# Patient Record
Sex: MALE | Race: WHITE | NOT HISPANIC OR LATINO | Employment: OTHER | URBAN - METROPOLITAN AREA
[De-identification: names, ages, dates, MRNs, and addresses within clinical notes are randomized per-mention and may not be internally consistent; named-entity substitution may affect disease eponyms.]

---

## 2020-11-11 PROCEDURE — U0003 INFECTIOUS AGENT DETECTION BY NUCLEIC ACID (DNA OR RNA); SEVERE ACUTE RESPIRATORY SYNDROME CORONAVIRUS 2 (SARS-COV-2) (CORONAVIRUS DISEASE [COVID-19]), AMPLIFIED PROBE TECHNIQUE, MAKING USE OF HIGH THROUGHPUT TECHNOLOGIES AS DESCRIBED BY CMS-2020-01-R: HCPCS | Performed by: NURSE PRACTITIONER

## 2020-11-13 ENCOUNTER — TELEPHONE (OUTPATIENT)
Dept: URGENT CARE | Facility: CLINIC | Age: 60
End: 2020-11-13

## 2020-11-15 ENCOUNTER — APPOINTMENT (OUTPATIENT)
Dept: CT IMAGING | Facility: HOSPITAL | Age: 60
End: 2020-11-15

## 2020-11-15 ENCOUNTER — HOSPITAL ENCOUNTER (INPATIENT)
Facility: HOSPITAL | Age: 60
LOS: 3 days | Discharge: HOME | End: 2020-11-18
Attending: EMERGENCY MEDICINE | Admitting: INTERNAL MEDICINE

## 2020-11-15 DIAGNOSIS — U07.1 PNEUMONIA DUE TO COVID-19 VIRUS: Primary | ICD-10-CM

## 2020-11-15 DIAGNOSIS — J12.82 PNEUMONIA DUE TO COVID-19 VIRUS: Primary | ICD-10-CM

## 2020-11-15 DIAGNOSIS — R09.02 HYPOXEMIA: ICD-10-CM

## 2020-11-15 LAB
ALBUMIN SERPL-MCNC: 3.7 G/DL (ref 3.5–5.2)
ALBUMIN/GLOB SERPL: 1.2 G/DL
ALP SERPL-CCNC: 97 U/L (ref 39–117)
ALT SERPL W P-5'-P-CCNC: 26 U/L (ref 1–41)
ANION GAP SERPL CALCULATED.3IONS-SCNC: 12 MMOL/L (ref 5–15)
AST SERPL-CCNC: 18 U/L (ref 1–40)
BASOPHILS # BLD AUTO: 0 10*3/MM3 (ref 0–0.2)
BASOPHILS NFR BLD AUTO: 0.2 % (ref 0–1.5)
BILIRUB SERPL-MCNC: 0.6 MG/DL (ref 0–1.2)
BUN SERPL-MCNC: 22 MG/DL (ref 6–20)
BUN/CREAT SERPL: 27.2 (ref 7–25)
CALCIUM SPEC-SCNC: 9.2 MG/DL (ref 8.6–10.5)
CHLORIDE SERPL-SCNC: 95 MMOL/L (ref 98–107)
CO2 SERPL-SCNC: 25 MMOL/L (ref 22–29)
CREAT SERPL-MCNC: 0.81 MG/DL (ref 0.76–1.27)
CRP SERPL-MCNC: 25.04 MG/DL (ref 0–0.5)
DEPRECATED RDW RBC AUTO: 45.1 FL (ref 37–54)
EOSINOPHIL # BLD AUTO: 0 10*3/MM3 (ref 0–0.4)
EOSINOPHIL NFR BLD AUTO: 0 % (ref 0.3–6.2)
ERYTHROCYTE [DISTWIDTH] IN BLOOD BY AUTOMATED COUNT: 13.7 % (ref 12.3–15.4)
GFR SERPL CREATININE-BSD FRML MDRD: 98 ML/MIN/1.73
GLOBULIN UR ELPH-MCNC: 3.1 GM/DL
GLUCOSE SERPL-MCNC: 126 MG/DL (ref 65–99)
HCT VFR BLD AUTO: 42.7 % (ref 37.5–51)
HGB BLD-MCNC: 14.7 G/DL (ref 13–17.7)
HOLD SPECIMEN: NORMAL
LYMPHOCYTES # BLD AUTO: 0.3 10*3/MM3 (ref 0.7–3.1)
LYMPHOCYTES NFR BLD AUTO: 2 % (ref 19.6–45.3)
MCH RBC QN AUTO: 32.9 PG (ref 26.6–33)
MCHC RBC AUTO-ENTMCNC: 34.5 G/DL (ref 31.5–35.7)
MCV RBC AUTO: 95.3 FL (ref 79–97)
MONOCYTES # BLD AUTO: 0.3 10*3/MM3 (ref 0.1–0.9)
MONOCYTES NFR BLD AUTO: 2 % (ref 5–12)
NEUTROPHILS NFR BLD AUTO: 13 10*3/MM3 (ref 1.7–7)
NEUTROPHILS NFR BLD AUTO: 95.8 % (ref 42.7–76)
NRBC BLD AUTO-RTO: 0 /100 WBC (ref 0–0.2)
PLATELET # BLD AUTO: 343 10*3/MM3 (ref 140–450)
PMV BLD AUTO: 7.7 FL (ref 6–12)
POTASSIUM SERPL-SCNC: 4.2 MMOL/L (ref 3.5–5.2)
PROT SERPL-MCNC: 6.8 G/DL (ref 6–8.5)
RBC # BLD AUTO: 4.48 10*6/MM3 (ref 4.14–5.8)
SODIUM SERPL-SCNC: 132 MMOL/L (ref 136–145)
WBC # BLD AUTO: 13.5 10*3/MM3 (ref 3.4–10.8)
WHOLE BLOOD HOLD SPECIMEN: NORMAL

## 2020-11-15 PROCEDURE — 93005 ELECTROCARDIOGRAM TRACING: CPT | Performed by: EMERGENCY MEDICINE

## 2020-11-15 PROCEDURE — 0 IOPAMIDOL PER 1 ML: Performed by: EMERGENCY MEDICINE

## 2020-11-15 PROCEDURE — 80053 COMPREHEN METABOLIC PANEL: CPT | Performed by: EMERGENCY MEDICINE

## 2020-11-15 PROCEDURE — G0378 HOSPITAL OBSERVATION PER HR: HCPCS

## 2020-11-15 PROCEDURE — 25010000002 DEXAMETHASONE PER 1 MG: Performed by: EMERGENCY MEDICINE

## 2020-11-15 PROCEDURE — 87040 BLOOD CULTURE FOR BACTERIA: CPT | Performed by: EMERGENCY MEDICINE

## 2020-11-15 PROCEDURE — 85025 COMPLETE CBC W/AUTO DIFF WBC: CPT | Performed by: EMERGENCY MEDICINE

## 2020-11-15 PROCEDURE — 94799 UNLISTED PULMONARY SVC/PX: CPT

## 2020-11-15 PROCEDURE — 25010000002 AZITHROMYCIN PER 500 MG: Performed by: EMERGENCY MEDICINE

## 2020-11-15 PROCEDURE — 86140 C-REACTIVE PROTEIN: CPT | Performed by: EMERGENCY MEDICINE

## 2020-11-15 PROCEDURE — 99284 EMERGENCY DEPT VISIT MOD MDM: CPT

## 2020-11-15 PROCEDURE — 71275 CT ANGIOGRAPHY CHEST: CPT

## 2020-11-15 RX ORDER — DEXAMETHASONE SODIUM PHOSPHATE 4 MG/ML
8 INJECTION, SOLUTION INTRA-ARTICULAR; INTRALESIONAL; INTRAMUSCULAR; INTRAVENOUS; SOFT TISSUE ONCE
Status: COMPLETED | OUTPATIENT
Start: 2020-11-15 | End: 2020-11-15

## 2020-11-15 RX ORDER — ALBUTEROL SULFATE 90 UG/1
2 AEROSOL, METERED RESPIRATORY (INHALATION) ONCE
Status: COMPLETED | OUTPATIENT
Start: 2020-11-15 | End: 2020-11-15

## 2020-11-15 RX ADMIN — SODIUM CHLORIDE, POTASSIUM CHLORIDE, SODIUM LACTATE AND CALCIUM CHLORIDE 2000 ML: 600; 310; 30; 20 INJECTION, SOLUTION INTRAVENOUS at 13:48

## 2020-11-15 RX ADMIN — DEXAMETHASONE SODIUM PHOSPHATE 8 MG: 4 INJECTION, SOLUTION INTRAMUSCULAR; INTRAVENOUS at 13:47

## 2020-11-15 RX ADMIN — ALBUTEROL SULFATE 2 PUFF: 108 AEROSOL, METERED RESPIRATORY (INHALATION) at 13:16

## 2020-11-15 RX ADMIN — IOPAMIDOL 100 ML: 755 INJECTION, SOLUTION INTRAVENOUS at 14:50

## 2020-11-15 RX ADMIN — SODIUM CHLORIDE 500 MG: 900 INJECTION, SOLUTION INTRAVENOUS at 13:48

## 2020-11-15 NOTE — ED PROVIDER NOTES
Subjective   60-year-old male complaining of severe weakness and shortness of breath.  The patient reportedly had his home O2 saturation checked and found to be in the mid low 80s after just standing and walking across the room.  The patient states that he has had subjective fever as well as chills.  He has had an extensive nonproductive cough and states that his chest feels like it is on fire from irritation.  He reports he has had decrease in taste.  He reports he has had diarrhea.  Reports no vomiting but states that he has not had much of an appetite.  He has no history of previous reactive airway disease          Review of Systems   Constitutional: Positive for chills, fatigue and fever. Negative for unexpected weight change.   Respiratory: Positive for cough, chest tightness and shortness of breath. Negative for choking, wheezing and stridor.    Musculoskeletal: Positive for arthralgias and myalgias.   Hematological: Does not bruise/bleed easily.   All other systems reviewed and are negative.      Past Medical History:   Diagnosis Date   • Gout        No Known Allergies    No past surgical history on file.    No family history on file.    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 Conjunctivae are not injected. normal tympanic membranes. Oropharynx and nares are normal.   Neck: Supple. Midline  trachea. No JVD. No goiter.   Chest: Patient has Rales and rhonchi scattered bilaterally and equal breath sounds bilaterally regular rate and rhythm without murmur or rub.   Abdomen: Positive bowel sounds nontender nondistended. No rebound or peritoneal signs. No CVA tenderness.   Extremities no clubbing cyanosis or edema motor sensory exam is normal the full range of motion is intact   skin: Warm and dry, no rashes or petechia.   Lymphatic: No regional lymphadenopathy. No calf pain, swelling or Thierno's sign    Procedures           ED Course                                 Labs Reviewed   COMPREHENSIVE METABOLIC PANEL - Abnormal; Notable for the following components:       Result Value    Glucose 126 (*)     BUN 22 (*)     Sodium 132 (*)     Chloride 95 (*)     BUN/Creatinine Ratio 27.2 (*)     All other components within normal limits    Narrative:     GFR Normal >60  Chronic Kidney Disease <60  Kidney Failure <15     C-REACTIVE PROTEIN - Abnormal; Notable for the following components:    C-Reactive Protein 25.04 (*)     All other components within normal limits   CBC WITH AUTO DIFFERENTIAL - Abnormal; Notable for the following components:    WBC 13.50 (*)     Neutrophil % 95.8 (*)     Lymphocyte % 2.0 (*)     Monocyte % 2.0 (*)     Eosinophil % 0.0 (*)     Neutrophils, Absolute 13.00 (*)     Lymphocytes, Absolute 0.30 (*)     All other components within normal limits   BLOOD CULTURE   CBC AND DIFFERENTIAL    Narrative:     The following orders were created for panel order CBC & Differential.  Procedure                               Abnormality         Status                     ---------                               -----------         ------                     CBC Auto Differential[[id number]]        Abnormal            Final result                 Please view results for these tests on the individual orders.   EXTRA TUBES    Narrative:     The following orders were created for panel order Extra  Tubes.  Procedure                               Abnormality         Status                     ---------                               -----------         ------                     Light Blue Top[[id number]]                                   Final result               Gold Top - SST[[id number]]                                   Final result                 Please view results for these tests on the individual orders.   LIGHT BLUE TOP   GOLD TOP - SST     Medications   azithromycin 500 mg IVPB in 250 mL NS (500 mg Intravenous New Bag 11/15/20 1348)   albuterol sulfate HFA (PROVENTIL HFA;VENTOLIN HFA;PROAIR HFA) inhaler 2 puff (2 puffs Inhalation Given 11/15/20 1316)   dexamethasone (DECADRON) injection 8 mg (8 mg Intravenous Given 11/15/20 1347)   lactated ringers bolus 2,000 mL (2,000 mL Intravenous New Bag 11/15/20 1348)   iopamidol (ISOVUE-370) 76 % injection 100 mL (100 mL Intravenous Given 11/15/20 1450)     Ct Chest Pulmonary Embolism    Result Date: 11/15/2020  No evidence of pulmonary embolism. Multifocal pneumonia in a distribution consistent with Covid 19 pneumonia  Electronically Signed By-Leif Phoenix MD On:11/15/2020 3:10 PM This report was finalized on [id number] by  Leif Phoenix MD.              MDM  Number of Diagnoses or Management Options     Amount and/or Complexity of Data Reviewed  Clinical lab tests: reviewed  Tests in the radiology section of CPT®: reviewed  Review and summarize past medical records: yes  Independent visualization of images, tracings, or specimens: yes    Risk of Complications, Morbidity, and/or Mortality  Presenting problems: high  Diagnostic procedures: high  Management options: high  General comments: The patient's physician called prior to arrival and wanted the patient admitted for oxygen support and definitive management.  The PCP also requested Dr. Velasquez consult on the case.  The patient was agreeable with hospitalizations as he stated he felt terrible.        Final  diagnoses:   Pneumonia due to COVID-19 virus   Hypoxemia            Jacky Long MD  11/15/20 7496

## 2020-11-16 LAB
ALBUMIN SERPL-MCNC: 3 G/DL (ref 3.5–5.2)
ALBUMIN SERPL-MCNC: 3.1 G/DL (ref 3.5–5.2)
ALP SERPL-CCNC: 85 U/L (ref 39–117)
ALP SERPL-CCNC: 89 U/L (ref 39–117)
ALT SERPL W P-5'-P-CCNC: 26 U/L (ref 1–41)
ALT SERPL W P-5'-P-CCNC: 30 U/L (ref 1–41)
AST SERPL-CCNC: 20 U/L (ref 1–40)
AST SERPL-CCNC: 26 U/L (ref 1–40)
BILIRUB CONJ SERPL-MCNC: 0.2 MG/DL (ref 0–0.3)
BILIRUB CONJ SERPL-MCNC: <0.2 MG/DL (ref 0–0.3)
BILIRUB INDIRECT SERPL-MCNC: 0.3 MG/DL
BILIRUB INDIRECT SERPL-MCNC: ABNORMAL MG/DL
BILIRUB SERPL-MCNC: 0.5 MG/DL (ref 0–1.2)
BILIRUB SERPL-MCNC: 0.5 MG/DL (ref 0–1.2)
CREAT SERPL-MCNC: 0.8 MG/DL (ref 0.76–1.27)
D DIMER PPP FEU-MCNC: 0.81 MG/L (FEU) (ref 0–0.59)
GFR SERPL CREATININE-BSD FRML MDRD: 99 ML/MIN/1.73
PROCALCITONIN SERPL-MCNC: 0.05 NG/ML (ref 0–0.25)
PROT SERPL-MCNC: 5.7 G/DL (ref 6–8.5)
PROT SERPL-MCNC: 6.1 G/DL (ref 6–8.5)

## 2020-11-16 PROCEDURE — 85379 FIBRIN DEGRADATION QUANT: CPT | Performed by: INTERNAL MEDICINE

## 2020-11-16 PROCEDURE — 25010000002 DEXAMETHASONE PER 1 MG: Performed by: NURSE PRACTITIONER

## 2020-11-16 PROCEDURE — 80076 HEPATIC FUNCTION PANEL: CPT | Performed by: NURSE PRACTITIONER

## 2020-11-16 PROCEDURE — 99220 PR INITIAL OBSERVATION CARE/DAY 70 MINUTES: CPT | Performed by: INTERNAL MEDICINE

## 2020-11-16 PROCEDURE — 25010000002 AZITHROMYCIN PER 500 MG: Performed by: NURSE PRACTITIONER

## 2020-11-16 PROCEDURE — 25010000003 AMPICILLIN-SULBACTAM PER 1.5 G: Performed by: NURSE PRACTITIONER

## 2020-11-16 PROCEDURE — G0378 HOSPITAL OBSERVATION PER HR: HCPCS

## 2020-11-16 PROCEDURE — XW033E5 INTRODUCTION OF REMDESIVIR ANTI-INFECTIVE INTO PERIPHERAL VEIN, PERCUTANEOUS APPROACH, NEW TECHNOLOGY GROUP 5: ICD-10-PCS | Performed by: INTERNAL MEDICINE

## 2020-11-16 PROCEDURE — 25010000002 ENOXAPARIN PER 10 MG: Performed by: NURSE PRACTITIONER

## 2020-11-16 PROCEDURE — 82565 ASSAY OF CREATININE: CPT | Performed by: NURSE PRACTITIONER

## 2020-11-16 PROCEDURE — 94799 UNLISTED PULMONARY SVC/PX: CPT

## 2020-11-16 PROCEDURE — 84145 PROCALCITONIN (PCT): CPT | Performed by: NURSE PRACTITIONER

## 2020-11-16 RX ORDER — ALBUTEROL SULFATE 90 UG/1
2 AEROSOL, METERED RESPIRATORY (INHALATION) EVERY 4 HOURS PRN
Status: DISCONTINUED | OUTPATIENT
Start: 2020-11-16 | End: 2020-11-18 | Stop reason: HOSPADM

## 2020-11-16 RX ORDER — ASCORBIC ACID 500 MG
1000 TABLET ORAL DAILY
Status: DISCONTINUED | OUTPATIENT
Start: 2020-11-16 | End: 2020-11-18 | Stop reason: HOSPADM

## 2020-11-16 RX ORDER — BUDESONIDE AND FORMOTEROL FUMARATE DIHYDRATE 160; 4.5 UG/1; UG/1
2 AEROSOL RESPIRATORY (INHALATION)
Status: DISCONTINUED | OUTPATIENT
Start: 2020-11-16 | End: 2020-11-18 | Stop reason: HOSPADM

## 2020-11-16 RX ORDER — SODIUM CHLORIDE 9 MG/ML
125 INJECTION, SOLUTION INTRAVENOUS CONTINUOUS
Status: DISCONTINUED | OUTPATIENT
Start: 2020-11-16 | End: 2020-11-18

## 2020-11-16 RX ORDER — BUDESONIDE 0.5 MG/2ML
0.5 INHALANT ORAL
Status: DISCONTINUED | OUTPATIENT
Start: 2020-11-16 | End: 2020-11-16 | Stop reason: ALTCHOICE

## 2020-11-16 RX ORDER — IPRATROPIUM BROMIDE AND ALBUTEROL SULFATE 2.5; .5 MG/3ML; MG/3ML
3 SOLUTION RESPIRATORY (INHALATION) EVERY 4 HOURS PRN
Status: DISCONTINUED | OUTPATIENT
Start: 2020-11-16 | End: 2020-11-16

## 2020-11-16 RX ORDER — PANTOPRAZOLE SODIUM 40 MG/1
40 TABLET, DELAYED RELEASE ORAL
Status: DISCONTINUED | OUTPATIENT
Start: 2020-11-17 | End: 2020-11-18 | Stop reason: HOSPADM

## 2020-11-16 RX ORDER — ALBUTEROL SULFATE 2.5 MG/3ML
2.5 SOLUTION RESPIRATORY (INHALATION) EVERY 4 HOURS PRN
Status: DISCONTINUED | OUTPATIENT
Start: 2020-11-16 | End: 2020-11-16

## 2020-11-16 RX ORDER — DEXAMETHASONE SODIUM PHOSPHATE 4 MG/ML
6 INJECTION, SOLUTION INTRA-ARTICULAR; INTRALESIONAL; INTRAMUSCULAR; INTRAVENOUS; SOFT TISSUE DAILY
Status: DISCONTINUED | OUTPATIENT
Start: 2020-11-16 | End: 2020-11-16

## 2020-11-16 RX ORDER — BENZONATATE 100 MG/1
200 CAPSULE ORAL 3 TIMES DAILY PRN
Status: DISCONTINUED | OUTPATIENT
Start: 2020-11-16 | End: 2020-11-18 | Stop reason: HOSPADM

## 2020-11-16 RX ORDER — ONDANSETRON 4 MG/1
4 TABLET, FILM COATED ORAL EVERY 6 HOURS PRN
Status: DISCONTINUED | OUTPATIENT
Start: 2020-11-16 | End: 2020-11-18 | Stop reason: HOSPADM

## 2020-11-16 RX ORDER — GUAIFENESIN 600 MG/1
600 TABLET, EXTENDED RELEASE ORAL EVERY 12 HOURS SCHEDULED
Status: DISCONTINUED | OUTPATIENT
Start: 2020-11-16 | End: 2020-11-18 | Stop reason: HOSPADM

## 2020-11-16 RX ORDER — ZINC SULFATE 50(220)MG
220 CAPSULE ORAL DAILY
Status: DISCONTINUED | OUTPATIENT
Start: 2020-11-16 | End: 2020-11-18 | Stop reason: HOSPADM

## 2020-11-16 RX ORDER — DEXAMETHASONE SODIUM PHOSPHATE 4 MG/ML
2 INJECTION, SOLUTION INTRA-ARTICULAR; INTRALESIONAL; INTRAMUSCULAR; INTRAVENOUS; SOFT TISSUE EVERY 8 HOURS
Status: DISCONTINUED | OUTPATIENT
Start: 2020-11-16 | End: 2020-11-17

## 2020-11-16 RX ADMIN — SODIUM CHLORIDE 500 MG: 900 INJECTION, SOLUTION INTRAVENOUS at 13:24

## 2020-11-16 RX ADMIN — GUAIFENESIN 600 MG: 600 TABLET, EXTENDED RELEASE ORAL at 20:29

## 2020-11-16 RX ADMIN — Medication 600 MG: at 20:29

## 2020-11-16 RX ADMIN — OXYCODONE HYDROCHLORIDE AND ACETAMINOPHEN 1000 MG: 500 TABLET ORAL at 08:49

## 2020-11-16 RX ADMIN — ZINC SULFATE 220 MG (50 MG) CAPSULE 220 MG: CAPSULE at 08:48

## 2020-11-16 RX ADMIN — Medication 600 MG: at 08:49

## 2020-11-16 RX ADMIN — AMPICILLIN SODIUM AND SULBACTAM SODIUM 1.5 G: 1; .5 INJECTION, POWDER, FOR SOLUTION INTRAMUSCULAR; INTRAVENOUS at 19:13

## 2020-11-16 RX ADMIN — BUDESONIDE AND FORMOTEROL FUMARATE DIHYDRATE 2 PUFF: 160; 4.5 AEROSOL RESPIRATORY (INHALATION) at 19:03

## 2020-11-16 RX ADMIN — SODIUM CHLORIDE 75 ML/HR: 9 INJECTION, SOLUTION INTRAVENOUS at 02:40

## 2020-11-16 RX ADMIN — SODIUM CHLORIDE 200 MG: 9 INJECTION, SOLUTION INTRAVENOUS at 11:33

## 2020-11-16 RX ADMIN — DEXAMETHASONE SODIUM PHOSPHATE 2 MG: 4 INJECTION, SOLUTION INTRAMUSCULAR; INTRAVENOUS at 08:49

## 2020-11-16 RX ADMIN — Medication 600 MG: at 19:24

## 2020-11-16 RX ADMIN — DEXAMETHASONE SODIUM PHOSPHATE 2 MG: 4 INJECTION, SOLUTION INTRAMUSCULAR; INTRAVENOUS at 15:58

## 2020-11-16 RX ADMIN — SODIUM CHLORIDE 125 ML/HR: 9 INJECTION, SOLUTION INTRAVENOUS at 19:22

## 2020-11-16 RX ADMIN — AMPICILLIN SODIUM AND SULBACTAM SODIUM 1.5 G: 1; .5 INJECTION, POWDER, FOR SOLUTION INTRAMUSCULAR; INTRAVENOUS at 08:49

## 2020-11-16 RX ADMIN — GUAIFENESIN 600 MG: 600 TABLET, EXTENDED RELEASE ORAL at 19:13

## 2020-11-16 RX ADMIN — AMPICILLIN SODIUM AND SULBACTAM SODIUM 1.5 G: 1; .5 INJECTION, POWDER, FOR SOLUTION INTRAMUSCULAR; INTRAVENOUS at 13:23

## 2020-11-16 RX ADMIN — Medication 600 MG: at 19:13

## 2020-11-16 RX ADMIN — THEOPHYLLINE ANHYDROUS 300 MG: 300 CAPSULE, EXTENDED RELEASE ORAL at 08:49

## 2020-11-16 RX ADMIN — GUAIFENESIN 600 MG: 600 TABLET, EXTENDED RELEASE ORAL at 08:48

## 2020-11-16 RX ADMIN — ENOXAPARIN SODIUM 40 MG: 40 INJECTION SUBCUTANEOUS at 15:58

## 2020-11-16 NOTE — PROGRESS NOTES
Discharge Planning Assessment  [facility]     Patient Name: Miah Carroll  MRN: [id number]  Today's Date: 11/16/2020    Admit Date: 11/15/2020    Discharge Needs Assessment     Row Name 11/16/20 1341       Living Environment    Lives With  alone    Current Living Arrangements  home/apartment/condo    Primary Care Provided by  self    Provides Primary Care For  no one    Able to Return to Prior Arrangements  yes       Resource/Environmental Concerns    Resource/Environmental Concerns  none    Transportation Concerns  car, none       Transition Planning    Patient/Family Anticipates Transition to  home    Patient/Family Anticipated Services at Transition  none    Transportation Anticipated  family or friend will provide       Discharge Needs Assessment    Readmission Within the Last 30 Days  no previous admission in last 30 days    Equipment Currently Used at Home  none    Concerns to be Addressed  denies needs/concerns at this time;no discharge needs identified    Anticipated Changes Related to Illness  none    Equipment Needed After Discharge  none        Discharge Plan     Row Name 11/16/20 1342       Plan    Plan  Anticipate routine home    Patient/Family in Agreement with Plan  yes    Plan Comments  Called and spoke to patient via room phone, reports he lives at home alone, I with ADLs, still drives. PCP Dr. Bruce and updated care teams. No issues affording food or medications. Currently denies any d/c needs or concerns. DC barriers: IV Remdesivir             Demographic Summary     Row Name 11/16/20 1345       General Information    Admission Type  observation    Arrived From  emergency department    Referral Source  admission list    Reason for Consult  discharge planning    Preferred Language  English        Functional Status     Row Name 11/16/20 1342       Functional Status    Usual Activity Tolerance  good    Current Activity Tolerance  good       Functional Status, IADL    Medications  independent    Meal  Preparation  independent    Housekeeping  independent    Laundry  independent    Shopping  independent              April Cramer RN

## 2020-11-16 NOTE — PLAN OF CARE
Problem: Adult Inpatient Plan of Care  Goal: Plan of Care Review  Outcome: Ongoing, Progressing  Flowsheets (Taken 11/16/2020 0404 by Erica Jiménez LPN)  Plan of Care Reviewed With: patient  Goal: Patient-Specific Goal (Individualized)  Outcome: Ongoing, Progressing  Goal: Absence of Hospital-Acquired Illness or Injury  Outcome: Ongoing, Progressing  Intervention: Identify and Manage Fall Risk  Recent Flowsheet Documentation  Taken 11/16/2020 1500 by Eloy Varghese RN  Safety Promotion/Fall Prevention:   safety round/check completed   nonskid shoes/slippers when out of bed  Taken 11/16/2020 1300 by Eloy Varghese RN  Safety Promotion/Fall Prevention:   safety round/check completed   nonskid shoes/slippers when out of bed  Taken 11/16/2020 1100 by Eloy Varghese RN  Safety Promotion/Fall Prevention:   safety round/check completed   nonskid shoes/slippers when out of bed  Taken 11/16/2020 0900 by Eloy Varghese RN  Safety Promotion/Fall Prevention:   safety round/check completed   nonskid shoes/slippers when out of bed  Taken 11/16/2020 0701 by Eloy Varghese RN  Safety Promotion/Fall Prevention:   safety round/check completed   nonskid shoes/slippers when out of bed  Intervention: Prevent Skin Injury  Recent Flowsheet Documentation  Taken 11/16/2020 0701 by Eloy Varghese RN  Body Position: position changed independently  Intervention: Prevent Infection  Recent Flowsheet Documentation  Taken 11/16/2020 1500 by Eloy Varghese RN  Infection Prevention: single patient room provided  Taken 11/16/2020 1300 by Eloy Varghese RN  Infection Prevention:   single patient room provided   rest/sleep promoted  Taken 11/16/2020 1100 by Eloy Varghese RN  Infection Prevention:   single patient room provided   rest/sleep promoted  Taken 11/16/2020 0900 by Eloy Varghese RN  Infection Prevention:   single patient room provided   rest/sleep promoted  Taken 11/16/2020 0701 by Eloy Varghese RN  Infection Prevention: single  patient room provided  Goal: Optimal Comfort and Wellbeing  Outcome: Ongoing, Progressing  Intervention: Provide Person-Centered Care  Recent Flowsheet Documentation  Taken 11/16/2020 0701 by Eloy Varghese, RN  Trust Relationship/Rapport:   care explained   thoughts/feelings acknowledged  Goal: Readiness for Transition of Care  Outcome: Ongoing, Progressing     Problem: Breathing Pattern Ineffective  Goal: Effective Breathing Pattern  Outcome: Ongoing, Progressing  Intervention: Promote Improved Breathing Pattern  Recent Flowsheet Documentation  Taken 11/16/2020 0701 by Eloy Varghese, RN  Supportive Measures: active listening utilized  Head of Bed (HOB): HOB at 20-30 degrees  Airway/Ventilation Management: airway patency maintained   Goal Outcome Evaluation:  Plan of Care Reviewed With: patient  Progress: no change       Pt rested throughout the shift. Pt had c/o nausea this AM but subsided in afternoon. Remdesivir started. Pt on room air and resting comfortable. Will continue to monitor.

## 2020-11-16 NOTE — H&P
"      [facility] Medicine [facility]      Patient Name: Miah Carroll  : 1960  MRN: [id number]  Primary Care Physician: Emiliana Bruce MD  Date of admission: 11/15/2020    Patient Care Team:  Emiliana Bruce MD as PCP - General (Family Medicine)          Subjective   History Present Illness     Chief Complaint:   Chief Complaint   Patient presents with   • Shortness of Breath     covid +       Patient is a 60 year old male who presented to the ED with vague complaints of \" feeling bad\", nausea, vomiting and diarrhea since last week. Patient reports he was tested for COVID on 2020 but just recently received his results. Patient does reports his symptoms have been constant and moderate. He has had associated fever. He denies any cough or shortness of breath    Per records he was diagnosed with Covid-19 at [facility]. He was tested 20 but did not get results for a few days. He was on po Doxycycline and po steroids but seemed to indicate he did not take them. Upon exam he was not wearing oxygen and did not appear to be in distress .    Patient was admitted with consult for Dr. Velasquez at patient's PCP request.       Review of Systems   Constitution: Positive for fever and malaise/fatigue.   HENT: Negative.    Eyes: Negative.    Cardiovascular: Negative.    Respiratory: Negative.    Endocrine: Negative.    Hematologic/Lymphatic: Negative.    Skin: Negative.    Musculoskeletal: Positive for myalgias.   Gastrointestinal: Positive for diarrhea, nausea and vomiting.   Genitourinary: Negative.    Neurological: Positive for weakness.   Psychiatric/Behavioral: Negative.    Allergic/Immunologic: Negative.            Personal History     Past Medical History:   Past Medical History:   Diagnosis Date   • Gout    • Reactive airway disease        Surgical History:    History reviewed. No pertinent surgical history.        Family History: family history includes No Known Problems in his father and mother. " Otherwise pertinent FHx was reviewed and unremarkable.     Social History:  reports that he has never smoked. He has never used smokeless tobacco. He reports current alcohol use. Drug use questions deferred to the physician.      Medications:  Prior to Admission medications    Medication Sig Start Date End Date Taking? Authorizing Provider   albuterol sulfate  (90 Base) MCG/ACT inhaler Inhale 2 puffs Every 4 (Four) Hours As Needed for Wheezing or Shortness of Air. 11/11/20  Yes Jennifer Wang APRN   benzonatate (TESSALON) 200 MG capsule Take 1 capsule by mouth 3 (Three) Times a Day As Needed for Cough. 11/11/20  Yes Jennifer Wang APRN   doxycycline (VIBRAMYCIN) 100 MG capsule Take 1 capsule by mouth 2 (Two) Times a Day. 11/11/20  Yes Jennifer Wang APRN   methylPREDNISolone (MEDROL) 4 MG dose pack Take as directed on package instructions. 11/11/20  Yes Jennifer Wang APRN       Allergies:  No Known Allergies    Objective   Objective     Vital Signs  Temp:  [97.6 °F (36.4 °C)-99 °F (37.2 °C)] 98.2 °F (36.8 °C)  Heart Rate:  [49-76] 49  Resp:  [15-17] 16  BP: ()/(64-86) 115/71  SpO2:  [93 %-100 %] 93 %  on  Flow (L/min):  [1-2] 1;   Device (Oxygen Therapy): room air  Body mass index is 26.26 kg/m².    Physical Exam  Vitals signs reviewed.   Constitutional:       General: He is not in acute distress.     Appearance: Normal appearance.   HENT:      Head: Normocephalic and atraumatic.      Mouth/Throat:      Mouth: Mucous membranes are moist.   Eyes:      General: No scleral icterus.     Pupils: Pupils are equal, round, and reactive to light.   Cardiovascular:      Rate and Rhythm: Normal rate and regular rhythm.      Heart sounds: No murmur.   Pulmonary:      Effort: Pulmonary effort is normal. No respiratory distress.      Breath sounds: No wheezing or rales.   Abdominal:      General: Bowel sounds are normal. There is no distension.      Tenderness: There is no abdominal tenderness. There is no  guarding.   Musculoskeletal:      Right lower leg: No edema.      Left lower leg: No edema.   Skin:     General: Skin is warm and dry.      Findings: No rash.   Neurological:      General: No focal deficit present.      Mental Status: He is alert. Mental status is at baseline.   Psychiatric:         Mood and Affect: Mood normal.         Behavior: Behavior normal.         Results Review:  I have personally reviewed most recent radiology images and interpretations and agree with findings, most notably: CT chest and Covid-19 CBC.    Results from last 7 days   Lab Units 11/15/20  1304   WBC 10*3/mm3 13.50*   HEMOGLOBIN g/dL 14.7   HEMATOCRIT % 42.7   PLATELETS 10*3/mm3 343     Results from last 7 days   Lab Units 11/16/20  1018 11/16/20  0624 11/15/20  1304   SODIUM mmol/L  --   --  132*   POTASSIUM mmol/L  --   --  4.2   CHLORIDE mmol/L  --   --  95*   CO2 mmol/L  --   --  25.0   BUN mg/dL  --   --  22*   CREATININE mg/dL  --  0.80 0.81   GLUCOSE mg/dL  --   --  126*   CALCIUM mg/dL  --   --  9.2   ALT (SGPT) U/L 26 30 26   AST (SGOT) U/L 20 26 18   PROCALCITONIN ng/mL  --  0.05  --      Estimated Creatinine Clearance: 118.2 mL/min (by C-G formula based on SCr of 0.8 mg/dL).  Brief Urine Lab Results     None          Microbiology Results (last 10 days)     Procedure Component Value - Date/Time    Blood Culture - Blood, Arm, Left [[id number]] Collected: 11/15/20 1304    Lab Status: Preliminary result Specimen: Blood from Arm, Left Updated: 11/16/20 1315     Blood Culture No growth at 24 hours    COVID-19,[facility],Call Lab for Collection Requirements - , [[id number]]  (Abnormal) Collected: 11/11/20 0919    Lab Status: Final result Updated: 11/15/20 1206    COVID-19,[facility],NP/OP Swab in Transport Media or ESwab 72 HR TAT - Swab, Anterior nasal [[id number]]  (Abnormal) Collected: 11/11/20 0901    Lab Status: Final result Specimen: Swab from Anterior nasal Updated: 11/13/20 0711    Narrative:      The following  orders were created for panel order COVID-19,[facility],NP/OP Swab in Transport Media or ESwab 72 HR TAT - Swab, Anterior nasal.  Procedure                               Abnormality         Status                     ---------                               -----------         ------                     COVID-19,[facility] ROUTINE...[[id number]]  Abnormal            Final result                 Please view results for these tests on the individual orders.    COVID-19,[facility] ROUTINE, NP/OP SWAB IN TRANSPORT MEDIA OR ESWAB 72 HR TAT - Swab, Anterior nasal [[id number]]  (Abnormal) Collected: 11/11/20 0901    Lab Status: Final result Specimen: Swab from Anterior nasal Updated: 11/13/20 0711     SARS-CoV-2, APOLINAR Detected     Comment: This nucleic acid amplification test was developed and its performance  characteristics determined by [employer]. Nucleic acid  amplification tests include PCR and TMA. This test has not been FDA  cleared or approved. This test has been authorized by FDA under an  Emergency Use Authorization (EUA). This test is only authorized for  the duration of time the declaration that circumstances exist  justifying the authorization of the emergency use of in vitro  diagnostic tests for detection of SARS-CoV-2 virus and/or diagnosis  of COVID-19 infection under section 564(b)(1) of the Act, 21 U.S.C.  360bbb-3(b) (1), unless the authorization is terminated or revoked  sooner.  When diagnostic testing is negative, the possibility of a false  negative result should be considered in the context of a patient's  recent exposures and the presence of clinical signs and symptoms  consistent with COVID-19. An individual without symptoms of COVID-19  and who is not shedding SARS-CoV-2 virus would expect to have a  negative (not detected) result in this assay.       Narrative:      Performed at:  [facility] - [facility]  [address], [place], [place]  [id number]  : Alvaro Ramirez [facility], Phone:  [phone]           ECG/EMG Results (most recent)     Procedure Component Value Units Date/Time    ECG 12 Lead [[id number]] Collected: 11/15/20 1259     Updated: 11/15/20 1301     QT Interval 392 ms     Narrative:      HEART RATE= 74  bpm  RR Interval= 808  ms  WA Interval= 150  ms  P Horizontal Axis= -11  deg  P Front Axis= 55  deg  QRSD Interval= 88  ms  QT Interval= 392  ms  QRS Axis= 199  deg  T Wave Axis= 14  deg  - ABNORMAL ECG -  Sinus rhythm  Probable right ventricular hypertrophy  Electronically Signed By:   Date and Time of Study: 2020-11-15 12:59:58             EKG personally reviewed and shows NSR with no acute st-t changes        Ct Chest Pulmonary Embolism    Result Date: 11/15/2020  No evidence of pulmonary embolism. Multifocal pneumonia in a distribution consistent with Covid 19 pneumonia  Electronically Signed By-Leif Phoenix MD On:11/15/2020 3:10 PM This report was finalized on [id number] by  Leif Phoenix MD.        Estimated Creatinine Clearance: 118.2 mL/min (by C-G formula based on SCr of 0.8 mg/dL).    Assessment/Plan   Assessment/Plan       Active Hospital Problems    Diagnosis  POA   • **Pneumonia due to COVID-19 virus [U07.1, J12.89]  Yes   • Reactive airway disease [J45.909]  Yes   • Hyponatremia [E87.1]  Yes   • Nausea [R11.0]  Yes      Resolved Hospital Problems   No resolved problems to display.       Pneumonia due to Covid-19   - CT Chest reviewed and shows multifocal pneumonia   - continue IV steroids  - procalcitonin negative   - continue vitamin C and zinc sulfate   - on Unasyn and Remdesivir per pulmonology  - on room air     Elevated d- dimer   - likely d/t above  - CT PE protocol negative for PE     Hyponatremia likely d/t volume depletion  - Increase IV fluids     Nausea, Reflux  - instructed patient to sit up right when eating or drinking and stay sitting up at least 30 minutes after eating  - add Protonix and Zofran     Reactive Airway disease   - likely exacerbated by above  - continue  home inhalers   - pulmonology consulted in the ED    DVT Prophylaxis  - enoxaparin           VTE Prophylaxis -   Mechanical Order History:     None      Pharmalogical Order History:      Ordered     Dose Route Frequency Stop    11/16/20 0107  enoxaparin (LOVENOX) syringe 40 mg      40 mg SC Every 24 Hours --                CODE STATUS:    Code Status and Medical Interventions:   Ordered at: 11/16/20 0108     Code Status:    CPR     Medical Interventions (Level of Support Prior to Arrest):    Full       This patient has been examined wearing appropriate Personal Protective Equipment. 11/16/20      I discussed the patient's findings and my recommendations with patient.      Signature:Electronically signed by JESUS Rosenthal, 11/16/20, 2:55 AM EST.    [facility] Hospitalist Team    For this patient encounter, I reviewed the mid-level provider documentation, medical decision making and treatment plan, and I have personally spent time with the patient.     Electronically signed by Jaswant Elizondo DO, 11/16/20, 4:21 PM EST.

## 2020-11-16 NOTE — PAYOR COMM NOTE
"Admit clinical for case# [id number]  ------  ER admit 11/15 in observation status.   COVID+  Pt started on IV Remdesivir   MD notes attached.   -----  AUTHORIZATION PENDING:   PLEASE FAX OR CALL DETERMINATION TO CONTACT BELOW:       THANK YOU,    DAVONTE BraswellN, RN  Utilization Review  [facility]  Phone: [phone]  Fax: [phone]      NPI: [id number]  Tax ID: [id number]      Miah Carroll (60 y.o. Male)     Date of Birth Social Security Number Address Home Phone MRN    1960  [id number] [place] [facility] [phone] [id number]    [employer] Marital Status          None Single       Admission Date Admission Type Admitting Provider Attending Provider Department, Room/Bed    11/15/20 Emergency Jaswant Elizondo DO Maddox, Birr[place], DO [facility] 3A MEDICAL INPATIENT, 307/1    Discharge Date Discharge Disposition Discharge Destination                       Attending Provider: Jaswant Elizondo DO    Allergies: No Known Allergies    Isolation: Enh Drop/Con   Infection: COVID (confirmed) (20)   Code Status: CPR    Ht: 177.8 cm (70\")   Wt: 83 kg (183 lb)    Admission Cmt: None   Principal Problem: None                Active Insurance as of 11/15/2020     Primary Coverage     Payor Plan Insurance Group Employer/Plan Group    [facility]      Payor Plan Address Payor Plan Phone Number Payor Plan Fax Number Effective Dates    [address] [phone]  2019 - None Entered    [facility] [id number]       Subscriber Name Subscriber Birth Date Member ID       MIAH CARROLL 1960 [id number]                 Emergency Contacts      (Rel.) Home Phone Work Phone Mobile Phone    NELSON CARROLL (Father) [phone] -- --               History & Physical      Essing-Reena Powell APRN at 20 0109                [facility] Medicine Services      Patient Name: Miah Carroll  : 1960  MRN: " "[id number]  Primary Care Physician: Provider, No Known  Date of admission: 11/15/2020    Patient Care Team:  Provider, No Known as PCP - General          Subjective   History Present Illness     Chief Complaint:   Chief Complaint   Patient presents with   • Shortness of Breath     covid +                  60 [facility] \" [facility]\", nausea, vomiting and diarrhea. He did not complain of dyspnea or cough . He did report fever. Per records he was diagnosed with Covid-19 at [facility]. He was tested 11/11/20 but did not get results for a few days. He was on po Doxycycline and po steroids but seemed to indicate he did not take them.Upon exam he was not wearing  Oxygen and did not appear to be in distress .    \"No evidence of pulmonary embolism. Multifocal pneumonia in a  distribution consistent with Covid 19 pneumonia\"      CRP is elevated, WBC is 13.5 and low grade temp. He was started on IV Dexamethasone and IV azithromycin and Covid- progression labs and procalcitonin pending . D-Dimer was elevated. CT chest per radiology:    Remdesivir was initially ordered but since patient does not seem to require oxygen this was discontinued before initiation. PMH includes reactive airway disease - on home inhalers .       Review of Systems   Constitution: Positive for fever and malaise/fatigue.   HENT: Negative.    Eyes: Negative.    Cardiovascular: Negative.    Respiratory: Negative.    Endocrine: Negative.    Hematologic/Lymphatic: Negative.    Skin: Negative.    Musculoskeletal: Positive for myalgias.   Gastrointestinal: Positive for diarrhea, nausea and vomiting.   Genitourinary: Negative.    Neurological: Positive for weakness.   Psychiatric/Behavioral: Negative.    Allergic/Immunologic: Negative.            Personal History     Past Medical History:   Past Medical History:   Diagnosis Date   • Gout    • Reactive airway disease        Surgical History:    History reviewed. No pertinent " surgical history.        Family History: family history includes No Known Problems in his father and mother. Otherwise pertinent FHx was reviewed and unremarkable.     Social History:  reports that he has never smoked. He has never used smokeless tobacco. He reports current alcohol use. Drug use questions deferred to the physician.      Medications:  Prior to Admission medications    Medication Sig Start Date End Date Taking? Authorizing Provider   albuterol sulfate  (90 Base) MCG/ACT inhaler Inhale 2 puffs Every 4 (Four) Hours As Needed for Wheezing or Shortness of Air. 11/11/20  Yes Jennifer Wang APRN   benzonatate (TESSALON) 200 MG capsule Take 1 capsule by mouth 3 (Three) Times a Day As Needed for Cough. 11/11/20  Yes Jennifer Wang APRN   doxycycline (VIBRAMYCIN) 100 MG capsule Take 1 capsule by mouth 2 (Two) Times a Day. 11/11/20  Yes Jennifer Wang APRN   methylPREDNISolone (MEDROL) 4 MG dose pack Take as directed on package instructions. 11/11/20  Yes Jennifer Wang APRN       Allergies:  No Known Allergies    Objective   Objective     Vital Signs  Temp:  [99 °F (37.2 °C)-100.2 °F (37.9 °C)] 99 °F (37.2 °C)  Heart Rate:  [52-89] 54  Resp:  [17-18] 17  BP: ()/(64-89) 119/76  SpO2:  [93 %-99 %] 93 %  on  Flow (L/min):  [2] 2;   Device (Oxygen Therapy): nasal cannula  Body mass index is 26.26 kg/m².    Physical Exam  Constitutional:       Appearance: Normal appearance. He is normal weight.   HENT:      Right Ear: External ear normal.      Left Ear: External ear normal.      Nose: Nose normal.      Mouth/Throat:      Mouth: Mucous membranes are moist.   Eyes:      Extraocular Movements: Extraocular movements intact.   Neck:      Musculoskeletal: Normal range of motion and neck supple.   Cardiovascular:      Rate and Rhythm: Normal rate and regular rhythm.      Pulses: Normal pulses.      Heart sounds: Normal heart sounds.   Pulmonary:      Effort: Pulmonary effort is normal.      Breath sounds:  Normal breath sounds.   Abdominal:      General: Abdomen is flat.      Palpations: Abdomen is soft.   Genitourinary:     Comments: deferred  Musculoskeletal: Normal range of motion.   Skin:     General: Skin is warm and dry.   Neurological:      General: No focal deficit present.      Mental Status: He is alert and oriented to person, place, and time.   Psychiatric:         Mood and Affect: Mood normal.         Behavior: Behavior normal.         Thought Content: Thought content normal.         Judgment: Judgment normal.         Results Review:  I have personally reviewed most recent radiology images and interpretations and agree with findings, most notably: CT chest and Covid-19 CBC.    Results from last 7 days   Lab Units 11/15/20  1304   WBC 10*3/mm3 13.50*   HEMOGLOBIN g/dL 14.7   HEMATOCRIT % 42.7   PLATELETS 10*3/mm3 343     Results from last 7 days   Lab Units 11/15/20  1304   SODIUM mmol/L 132*   POTASSIUM mmol/L 4.2   CHLORIDE mmol/L 95*   CO2 mmol/L 25.0   BUN mg/dL 22*   CREATININE mg/dL 0.81   GLUCOSE mg/dL 126*   CALCIUM mg/dL 9.2   ALT (SGPT) U/L 26   AST (SGOT) U/L 18     Estimated Creatinine Clearance: 116.7 mL/min (by C-G formula based on SCr of 0.81 mg/dL).  Brief Urine Lab Results     None          Microbiology Results (last 10 days)     Procedure Component Value - Date/Time    COVID-19,[facility],Call Lab for Collection Requirements - , [[id number]]  (Abnormal) Collected: 11/11/20 0919    Lab Status: Final result Updated: 11/15/20 1206    COVID-19,[facility],NP/OP Swab in Transport Media or ESwab 72 HR TAT - Swab, Anterior nasal [[id number]]  (Abnormal) Collected: 11/11/20 0901    Lab Status: Final result Specimen: Swab from Anterior nasal Updated: 11/13/20 0711    Narrative:      The following orders were created for panel order COVID-19,[facility],NP/OP Swab in Transport Media or ESwab 72 HR TAT - Swab, Anterior nasal.  Procedure                               Abnormality         Status                      ---------                               -----------         ------                     [facility] ROUTINE...[[id number]]  Abnormal            Final result                 Please view results for these tests on the individual orders.    COVID-19,[facility] ROUTINE, NP/OP SWAB IN TRANSPORT MEDIA OR ESWAB 72 HR TAT - Swab, Anterior nasal [[id number]]  (Abnormal) Collected: 11/11/20 0901    Lab Status: Final result Specimen: Swab from Anterior nasal Updated: 11/13/20 0711     SARS-CoV-2, APOLINAR Detected     Comment: This nucleic acid amplification test was developed and its performance  characteristics determined by [facility]. Nucleic acid  amplification tests include PCR and TMA. This test has not been FDA  cleared or approved. This test has been authorized by FDA under an  Emergency Use Authorization (EUA). This test is only authorized for  the duration of time the declaration that circumstances exist  justifying the authorization of the emergency use of in vitro  diagnostic tests for detection of SARS-CoV-2 virus and/or diagnosis  of COVID-19 infection under section 564(b)(1) of the Act, 21 U.S.C.  360bbb-3(b) (1), unless the authorization is terminated or revoked  sooner.  When diagnostic testing is negative, the possibility of a false  negative result should be considered in the context of a patient's  recent exposures and the presence of clinical signs and symptoms  consistent with COVID-19. An individual without symptoms of COVID-19  and who is not shedding SARS-CoV-2 virus would expect to have a  negative (not detected) result in this assay.       Narrative:      Performed at:  01 - [facility]  [address], [place]  [id number]  : Alvaro Ramirez [facility], Phone:  [phone]          ECG/EMG Results (most recent)     Procedure Component Value Units Date/Time    ECG 12 Lead [[id number]] Collected: 11/15/20 1259     Updated: 11/15/20 1301     QT Interval 392 ms     Narrative:       HEART RATE= 74  bpm  RR Interval= 808  ms  ME Interval= 150  ms  P Horizontal Axis= -11  deg  P Front Axis= 55  deg  QRSD Interval= 88  ms  QT Interval= 392  ms  QRS Axis= 199  deg  T Wave Axis= 14  deg  - ABNORMAL ECG -  Sinus rhythm  Probable right ventricular hypertrophy  Electronically Signed By:   Date and Time of Study: 2020-11-15 12:59:58                    Ct Chest Pulmonary Embolism    Result Date: 11/15/2020  No evidence of pulmonary embolism. Multifocal pneumonia in a distribution consistent with Covid 19 pneumonia  Electronically Signed By-Leif Phoenix MD On:11/15/2020 3:10 PM This report was finalized on [id number] by  Leif Phoenix MD.        Estimated Creatinine Clearance: 116.7 mL/min (by C-G formula based on SCr of 0.81 mg/dL).    Assessment/Plan   Assessment/Plan       Active Hospital Problems    Diagnosis  POA   • Pneumonia due to COVID-19 virus [U07.1, J12.89]  Yes      Resolved Hospital Problems   No resolved problems to display.       Pneumonia due to Covid-19 per CT and positive test, continue IV Dexamethasone 6 mg daily, continue Azithromycin IV due to elevated wbc with procalcitonin pending, Covid- progression labs ordered. Now stable on room air , tylenol prn fever     Reactive Airway disease - likely exacerbated by above,  on home inhalers           VTE Prophylaxis -   Mechanical Order History:     None      Pharmalogical Order History:      Ordered     Dose Route Frequency Stop    11/16/20 0107  enoxaparin (LOVENOX) syringe 40 mg      40 mg SC Every 24 Hours --                CODE STATUS:    Code Status and Medical Interventions:   Ordered at: 11/16/20 0108     Code Status:    CPR     Medical Interventions (Level of Support Prior to Arrest):    Full       This patient has been examined wearing appropriate Personal Protective Equipment. 11/16/20      I discussed the patient's findings and my recommendations with patient.      Signature:Electronically signed by Reena Emmanuel  JESUS, 11/16/20, 2:55 AM EST.    [facility] Hospitalist Team          Electronically signed by Reena Emmanuel APRN at 11/16/20 0257          Emergency Department Notes      Sophia Mata RegSched Rep at 11/15/20 1307        RT notified of MDI ordered     Sophia Mata RegSched Rep  11/15/20 1307      Electronically signed by Sophia Mata RegSched Rep at 11/15/20 1307     Jacky Long MD at 11/15/20 1538          Subjective   60-year-old male complaining of severe weakness and shortness of breath.  The patient reportedly had his home O2 saturation checked and found to be in the mid low 80s after just standing and walking across the room.  The patient states that he has had subjective fever as well as chills.  He has had an extensive nonproductive cough and states that his chest feels like it is on fire from irritation.  He reports he has had decrease in taste.  He reports he has had diarrhea.  Reports no vomiting but states that he has not had much of an appetite.  He has no history of previous reactive airway disease          Review of Systems   Constitutional: Positive for chills, fatigue and fever. Negative for unexpected weight change.   Respiratory: Positive for cough, chest tightness and shortness of breath. Negative for choking, wheezing and stridor.    Musculoskeletal: Positive for arthralgias and myalgias.   Hematological: Does not bruise/bleed easily.   All other systems reviewed and are negative.      Past Medical History:   Diagnosis Date   • Gout        No Known Allergies    No past surgical history on file.    No family history on file.    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 Conjunctivae are not injected. normal tympanic membranes. Oropharynx and nares are normal.   Neck: Supple. Midline trachea. No JVD. No goiter.   Chest: Patient has Rales and rhonchi scattered bilaterally and equal breath sounds bilaterally regular rate and rhythm without murmur or rub.   Abdomen: Positive bowel sounds nontender nondistended. No rebound or peritoneal signs. No CVA tenderness.   Extremities no clubbing cyanosis or edema motor sensory exam is normal the full range of motion is intact   skin: Warm and dry, no rashes or petechia.   Lymphatic: No regional lymphadenopathy. No calf pain, swelling or Thierno's sign    Procedures          ED Course                                 Labs Reviewed   COMPREHENSIVE METABOLIC PANEL - Abnormal; Notable for the following components:       Result Value    Glucose 126 (*)     BUN 22 (*)     Sodium 132 (*)     Chloride 95 (*)     BUN/Creatinine Ratio 27.2 (*)     All other components within normal limits    Narrative:     GFR Normal >60  Chronic Kidney Disease <60  Kidney Failure <15     C-REACTIVE PROTEIN - Abnormal; Notable for the following components:    C-Reactive Protein 25.04 (*)     All other components within normal limits   CBC WITH AUTO DIFFERENTIAL - Abnormal; Notable for the following components:    WBC 13.50 (*)     Neutrophil % 95.8 (*)     Lymphocyte % 2.0 (*)     Monocyte % 2.0 (*)     Eosinophil % 0.0 (*)     Neutrophils, Absolute 13.00 (*)     Lymphocytes, Absolute 0.30 (*)     All other components within normal limits   BLOOD CULTURE   CBC AND DIFFERENTIAL    Narrative:     The following orders were created for panel order CBC &  Differential.  Procedure                               Abnormality         Status                     ---------                               -----------         ------                     CBC Auto Differential[[id number]]        Abnormal            Final result                 Please view results for these tests on the individual orders.   EXTRA TUBES    Narrative:     The following orders were created for panel order Extra Tubes.  Procedure                               Abnormality         Status                     ---------                               -----------         ------                     Light Blue Top[[id number]]                                   Final result               Gold Top - SST[[id number]]                                   Final result                 Please view results for these tests on the individual orders.   LIGHT BLUE TOP   GOLD TOP - SST     Medications   azithromycin 500 mg IVPB in 250 mL NS (500 mg Intravenous New Bag 11/15/20 1348)   albuterol sulfate HFA (PROVENTIL HFA;VENTOLIN HFA;PROAIR HFA) inhaler 2 puff (2 puffs Inhalation Given 11/15/20 1316)   dexamethasone (DECADRON) injection 8 mg (8 mg Intravenous Given 11/15/20 1347)   lactated ringers bolus 2,000 mL (2,000 mL Intravenous New Bag 11/15/20 1348)   iopamidol (ISOVUE-370) 76 % injection 100 mL (100 mL Intravenous Given 11/15/20 1450)     Ct Chest Pulmonary Embolism    Result Date: 11/15/2020  No evidence of pulmonary embolism. Multifocal pneumonia in a distribution consistent with Covid 19 pneumonia  Electronically Signed By-Leif Phoenix MD On:11/15/2020 3:10 PM This report was finalized on [id number] by  Leif Phoenix MD.              MDM  Number of Diagnoses or Management Options     Amount and/or Complexity of Data Reviewed  Clinical lab tests: reviewed  Tests in the radiology section of CPT®: reviewed  Review and summarize past medical records: yes  Independent visualization of images, tracings, or specimens:  yes    Risk of Complications, Morbidity, and/or Mortality  Presenting problems: high  Diagnostic procedures: high  Management options: high  General comments: The patient's physician called prior to arrival and wanted the patient admitted for oxygen support and definitive management.  The PCP also requested Dr. Velasquez consult on the case.  The patient was agreeable with hospitalizations as he stated he felt terrible.        Final diagnoses:   Pneumonia due to COVID-19 virus   Hypoxemia            Jacky Long MD  11/15/20 1542      Electronically signed by Jacky Long MD at 11/15/20 1542       Oxygen Therapy (last day)     Date/Time   SpO2   Device (Oxygen Therapy)   Flow (L/min)   Oxygen Concentration (%)   ETCO2 (mmHg)    11/16/20 1006   100   nasal cannula   1   --   --    11/16/20 0701   --   nasal cannula   1   --   --    11/16/20 0446   97   nasal cannula   2   --   --    11/16/20 0000   --   nasal cannula   2   --   --    11/15/20 2318   93   room air   --   --   --    11/15/20 2156   97   --   --   --   --    11/15/20 2135   98   --   --   --   --    11/15/20 2056   97   --   --   --   --    11/15/20 2046   97   --   --   --   --    11/15/20 2035   97   --   --   --   --    11/15/20 1946   97   --   --   --   --    11/15/20 1936   99   --   --   --   --    11/15/20 1755   98   --   --   --   --    11/15/20 1743   99   --   --   --   --    11/15/20 1735   96   --   --   --   --    11/15/20 1709   97   --   --   --   --    11/15/20 1646   97   --   --   --   --    11/15/20 1630   95   --   --   --   --    11/15/20 1621   97   --   --   --   --    11/15/20 1618   96   --   --   --   --    11/15/20 1611   97   --   --   --   --    11/15/20 1600   97   --   --   --   --    11/15/20 1554   98   --   --   --   --    11/15/20 1548   96   --   --   --   --    11/15/20 1535   96   --   --   --   --    11/15/20 1453   93   --   --   --   --    11/15/20 1438   98   --   --   --   --    11/15/20 1430   96   --    --   --   --    11/15/20 1416   96   --   --   --   --    11/15/20 1406   95   --   --   --   --    11/15/20 1351   96   --   --   --   --    11/15/20 1346   95   --   --   --   --    11/15/20 1332   96   --   --   --   --    11/15/20 1326   96   --   --   --   --    11/15/20 1319   96   room air   --   --   --    11/15/20 1316   96   room air   --   --   --    11/15/20 1227   94   room air   --   --   --                Facility-Administered Medications as of 11/16/2020   Medication Dose Route Frequency Provider Last Rate Last Dose   • Acetylcysteine capsule 600 mg  600 mg Oral BID Susan Lozada APRN   600 mg at 11/16/20 0849   • [COMPLETED] albuterol sulfate HFA (PROVENTIL HFA;VENTOLIN HFA;PROAIR HFA) inhaler 2 puff  2 puff Inhalation Once Jacky Long MD   2 puff at 11/15/20 1316   • albuterol sulfate HFA (PROVENTIL HFA;VENTOLIN HFA;PROAIR HFA) inhaler 2 puff  2 puff Inhalation Q4H PRN Reena Emmanuel APRN       • ampicillin-sulbactam (UNASYN) 1.5 g in sodium chloride 0.9 % 100 mL IVPB-MBP  1.5 g Intravenous Q6H Susan Lozada APRN   Stopped at 11/16/20 0920   • azithromycin 500 mg IVPB in 250 mL NS  500 mg Intravenous Q24H Reena Emmanuel APRN   Stopped at 11/15/20 1800   • benzonatate (TESSALON) capsule 200 mg  200 mg Oral TID PRN Reena Emmanuel APRN       • budesonide-formoterol (SYMBICORT) 160-4.5 MCG/ACT inhaler 2 puff  2 puff Inhalation BID - RT Susan Lozada APRN       • dexamethasone (DECADRON) injection 2 mg  2 mg Intravenous Q8H Susan Lozada APRN   2 mg at 11/16/20 0849   • [COMPLETED] dexamethasone (DECADRON) injection 8 mg  8 mg Intravenous Once Jacky Long MD   8 mg at 11/15/20 1347   • enoxaparin (LOVENOX) syringe 40 mg  40 mg Subcutaneous Q24H Susan Lozada APRN       • guaiFENesin (MUCINEX) 12 hr tablet 600 mg  600 mg Oral Q12H Susan Lozada APRN   600 mg at 11/16/20 0848   • [COMPLETED] iopamidol (ISOVUE-370) 76 % injection 100 mL  100 mL  Intravenous Once in imaging Jacky Long MD   100 mL at 11/15/20 1450   • [COMPLETED] lactated ringers bolus 2,000 mL  2,000 mL Intravenous Once Jacky Long MD 2,000 mL/hr at 11/15/20 1348 2,000 mL at 11/15/20 1348   • ondansetron (ZOFRAN) tablet 4 mg  4 mg Oral Q6H PRN Jaswant Elizondo DO       • [START ON 11/17/2020] pantoprazole (PROTONIX) EC tablet 40 mg  40 mg Oral Q AM Jaswant Elizondo DO       • Pharmacy Consult - Remdesivir   Does not apply Continuous PRN Susan Lozada APRN       • remdesivir 200 mg in sodium chloride 0.9 % 250 mL IVPB (powder vial)  200 mg Intravenous Q24H Susan Lozada APRN   200 mg at 11/16/20 1133    Followed by   • [START ON 11/17/2020] remdesivir 100 mg in sodium chloride 0.9 % 250 mL IVPB (powder vial)  100 mg Intravenous Q24H Susan Lozada APRN       • sodium chloride 0.9 % infusion  75 mL/hr Intravenous Continuous Essing-Reena Powell APRN 75 mL/hr at 11/16/20 1134 75 mL/hr at 11/16/20 1134   • theophylline (ROLO-24) 24 hr capsule 300 mg  300 mg Oral Q24H Susan Lozada APRN   300 mg at 11/16/20 0849   • vitamin C (ASCORBIC ACID) tablet 1,000 mg  1,000 mg Oral Daily Susan Lozada APRN   1,000 mg at 11/16/20 0849   • zinc sulfate (ZINCATE) capsule 220 mg  220 mg Oral Daily Susan Lozada, APRN   220 mg at 11/16/20 0848       Lab Results (last 24 hours)     Procedure Component Value Units Date/Time    Hepatic Function Panel [[id number]]  (Abnormal) Collected: 11/16/20 1018    Specimen: Blood Updated: 11/16/20 1133     Total Protein 5.7 g/dL      Albumin 3.00 g/dL      ALT (SGPT) 26 U/L      AST (SGOT) 20 U/L      Alkaline Phosphatase 89 U/L      Total Bilirubin 0.5 mg/dL      Bilirubin, Direct 0.2 mg/dL      Bilirubin, Indirect 0.3 mg/dL     Creatinine, Serum [[id number]]  (Normal) Collected: 11/16/20 0624    Specimen: Blood Updated: 11/16/20 0940     Creatinine 0.80 mg/dL      eGFR Non African Amer 99 mL/min/1.73     Narrative:      GFR Normal >60  Chronic  "Kidney Disease <60  Kidney Failure <15      Procalcitonin [[id number]]  (Normal) Collected: 11/16/20 0624    Specimen: Blood Updated: 11/16/20 0729     Procalcitonin 0.05 ng/mL     Narrative:      As a Marker for Sepsis (Non-Neonates):   1. <0.5 ng/mL represents a low risk of severe sepsis and/or septic shock.  1. >2 ng/mL represents a high risk of severe sepsis and/or septic shock.    As a Marker for Lower Respiratory Tract Infections that require antibiotic therapy:  PCT on Admission     Antibiotic Therapy             6-12 Hrs later  > 0.5                Strongly Recommended            >0.25 - <0.5         Recommended  0.1 - 0.25           Discouraged                   Remeasure/reassess PCT  <0.1                 Strongly Discouraged          Remeasure/reassess PCT      As 28 day mortality risk marker: \"Change in Procalcitonin Result\" (> 80 % or <=80 %) if Day 0 (or Day 1) and Day 4 values are available. Refer to [email or web address]   Change in PCT <=80 %   A decrease of PCT levels below or equal to 80 % defines a positive change in PCT test result representing a higher risk for 28-day all-cause mortality of patients diagnosed with severe sepsis or septic shock.  Change in PCT > 80 %   A decrease of PCT levels of more than 80 % defines a negative change in PCT result representing a lower risk for 28-day all-cause mortality of patients diagnosed with severe sepsis or septic shock.                Results may be falsely decreased if patient taking Biotin.     Hepatic Function Panel [[id number]]  (Abnormal) Collected: 11/16/20 0624    Specimen: Blood Updated: 11/16/20 0728     Total Protein 6.1 g/dL      Albumin 3.10 g/dL      ALT (SGPT) 30 U/L      AST (SGOT) 26 U/L      Comment: Specimen hemolyzed.  Results may be affected.        Alkaline Phosphatase 85 U/L      Total Bilirubin 0.5 mg/dL      Bilirubin, Direct <0.2 mg/dL      Comment: Specimen hemolyzed. Results may be affected.        Bilirubin, " Indirect --     Comment: Unable to calculate       Extra Tubes [[id number]] Collected: 11/15/20 1304    Specimen: Blood from Arm, Left Updated: 11/15/20 1415    Narrative:      The following orders were created for panel order Extra Tubes.  Procedure                               Abnormality         Status                     ---------                               -----------         ------                     Light Blue Top[[id number]]                                   Final result               Gold Top - SST[[id number]]                                   Final result                 Please view results for these tests on the individual orders.    Light Blue Top [[id number]] Collected: 11/15/20 1304    Specimen: Blood from Arm, Left Updated: 11/15/20 1415     Extra Tube hold for add-on     Comment: Auto resulted       Gold Top - SST [[id number]] Collected: 11/15/20 1304    Specimen: Blood from Arm, Left Updated: 11/15/20 1415     Extra Tube Hold for add-ons.     Comment: Auto resulted.       Comprehensive Metabolic Panel [[id number]]  (Abnormal) Collected: 11/15/20 1304    Specimen: Blood from Arm, Left Updated: 11/15/20 1349     Glucose 126 mg/dL      BUN 22 mg/dL      Creatinine 0.81 mg/dL      Sodium 132 mmol/L      Potassium 4.2 mmol/L      Chloride 95 mmol/L      CO2 25.0 mmol/L      Calcium 9.2 mg/dL      Total Protein 6.8 g/dL      Albumin 3.70 g/dL      ALT (SGPT) 26 U/L      AST (SGOT) 18 U/L      Alkaline Phosphatase 97 U/L      Total Bilirubin 0.6 mg/dL      eGFR Non African Amer 98 mL/min/1.73      Globulin 3.1 gm/dL      A/G Ratio 1.2 g/dL      BUN/Creatinine Ratio 27.2     Anion Gap 12.0 mmol/L     Narrative:      GFR Normal >60  Chronic Kidney Disease <60  Kidney Failure <15      C-reactive Protein [[id number]]  (Abnormal) Collected: 11/15/20 1304    Specimen: Blood from Arm, Left Updated: 11/15/20 1349     C-Reactive Protein 25.04 mg/dL     CBC & Differential [[id number]]  (Abnormal) Collected: 11/15/20  1304    Specimen: Blood from Arm, Left Updated: 11/15/20 1319    Narrative:      The following orders were created for panel order CBC & Differential.  Procedure                               Abnormality         Status                     ---------                               -----------         ------                     CBC Auto Differential[[id number]]        Abnormal            Final result                 Please view results for these tests on the individual orders.    CBC Auto Differential [[id number]]  (Abnormal) Collected: 11/15/20 1304    Specimen: Blood from Arm, Left Updated: 11/15/20 1319     WBC 13.50 10*3/mm3      RBC 4.48 10*6/mm3      Hemoglobin 14.7 g/dL      Hematocrit 42.7 %      MCV 95.3 fL      MCH 32.9 pg      MCHC 34.5 g/dL      RDW 13.7 %      RDW-SD 45.1 fl      MPV 7.7 fL      Platelets 343 10*3/mm3      Neutrophil % 95.8 %      Lymphocyte % 2.0 %      Monocyte % 2.0 %      Eosinophil % 0.0 %      Basophil % 0.2 %      Neutrophils, Absolute 13.00 10*3/mm3      Lymphocytes, Absolute 0.30 10*3/mm3      Monocytes, Absolute 0.30 10*3/mm3      Eosinophils, Absolute 0.00 10*3/mm3      Basophils, Absolute 0.00 10*3/mm3      nRBC 0.0 /100 WBC     Blood Culture - Blood, Arm, Left [[id number]] Collected: 11/15/20 1304    Specimen: Blood from Arm, Left Updated: 11/15/20 1308        Imaging Results (Last 24 Hours)     Procedure Component Value Units Date/Time    CT Chest Pulmonary Embolism [[id number]] Collected: 11/15/20 1508     Updated: 11/15/20 1527    Narrative:         DATE OF EXAM:  11/15/2020 2:42 PM     PROCEDURE:  CT CHEST PULMONARY EMBOLISM-     INDICATIONS:   PE suspected, high prob     COMPARISON:   No Comparisons Available     TECHNIQUE:  Routine transaxial slices were obtained through chest after  administration of intravenous 100 ml of Isovue 370. Reconstructed  coronal and sagittal images were also obtained. Automated exposure  control and iterative reconstruction methods were used.       FINDINGS:  There is excellent pulmonary arterial opacification and there are no  filling defects to suggest pulmonary embolic disease.     There are diffuse, bilateral upper and lower lobe areas of groundglass  attenuation consistent with pneumonia. The pattern would be consistent  with Covid 19 pneumonia. There are right hilar, right paratracheal and  subcarinal prominent lymph nodes likely reactive given the presence of  pneumonia.     Limited evaluation of the upper solid abdominal structures demonstrates  right renal cystic change.        Impression:      No evidence of pulmonary embolism. Multifocal pneumonia in a  distribution consistent with Covid 19 pneumonia     Electronically Signed By-Leif Phoenix MD On:11/15/2020 3:10 PM  This report was finalized on [id number] by  Leif Phoenix MD.             Consult Notes (last 24 hours) (Notes from 11/15/20 1223 through 11/16/20 1223)      Susan Lozada APRN at 11/16/20 0645      Consult Orders    1. Pulmonology (on-call MD unless specified) [[id number]] ordered by Jacky Long MD at 11/15/20 1545          Attestation signed by Kevin Velasquez MD at 11/16/20 1044       Seen and examined    I have reviewed this documentation and agree.     Early antiviral treatment is recommended in highly symptomatic patient with abnormal CT scan      COVID-19 LAB PANEL     COVID-19 test result  SARS-CoV-2, APOLINAR   Date Value Ref Range Status   11/11/2020 Detected (A) Not Detected Final     Comment:     This nucleic acid amplification test was developed and its performance  characteristics determined by [facility]. Nucleic acid  amplification tests include PCR and TMA. This test has not been FDA  cleared or approved. This test has been authorized by FDA under an  Emergency Use Authorization (EUA). This test is only authorized for  the duration of time the declaration that circumstances exist  justifying the authorization of the emergency use of in vitro  diagnostic  tests for detection of SARS-CoV-2 virus and/or diagnosis  of COVID-19 infection under section 564(b)(1) of the Act, 21 U.S.C.  360bbb-3(b) (1), unless the authorization is terminated or revoked  sooner.  When diagnostic testing is negative, the possibility of a false  negative result should be considered in the context of a patient's  recent exposures and the presence of clinical signs and symptoms  consistent with COVID-19. An individual without symptoms of COVID-19  and who is not shedding SARS-CoV-2 virus would expect to have a  negative (not detected) result in this assay.       COVID-19 Prognostic lab results  WBC with differential  Results from last 7 days   Lab Units 11/15/20  1304   WBC 10*3/mm3 13.50*   PLATELETS 10*3/mm3 343   NEUTROPHIL % % 95.8*   LYMPHOCYTE % % 2.0*   NEUTROS ABS 10*3/mm3 13.00*     Inflammatory markers  Results from last 7 days   Lab Units 11/16/20  0624 11/15/20  1304   CRP mg/dL  --  25.04*   PROCALCITONIN ng/mL 0.05  --    ALK PHOS U/L 85 97   AST (SGOT) U/L 26 18   ALT (SGPT) U/L 30 26       Poor COVID-19 outcomes associated with:  Neutrophil:Lymphocyte ratio >3.5  Thrombocytopenia  LFT's >5x upper limit of normal  LDH >400                   Group: [facility]         CONSULT NOTE    Patient Identification:  Miah Carroll  60 y.o.  male  1960  [id number]            Requesting physician: Attending physician    Reason for Consultation: Covid 19 pneumonia      History of Present Illness:    60 year old male who presents to ED with complaints of shortness of breath, non productive cough, diarrhea, chills, and pain in the chest. He checked his oxygen saturation at home and it was in the 80's. Patient has felt ill for over a week with progressive shortness of air. Today he is ill looking and short of breath in the bed while talking. Patient states he has not had appetite and has not eaten or drink well in the last week    Assessment:    Hypoxia due to Covid 19  pneumonia  No PE on CAT scan  Diffuse bilateral alveolar infiltrate secondary to COVID-19 infection  History of reactive airway disease      Recommendations:    Remdesivir added  Antibiotics azithromycin and unasyn  Steroids decadron 2 mg IV q 8  NS IV at 75  Antiinflammatory agents vit c, zinc, theophylline, and mucomyst  Anticoagulation enoxaparin per Covid protocol            Review of Sytems:  Review of Systems   Constitutional: Positive for activity change, appetite change, diaphoresis, fatigue and fever.   Respiratory: Positive for cough and shortness of breath.        Past Medical History:  Past Medical History:   Diagnosis Date   • Gout    • Reactive airway disease        Past Surgical History:  History reviewed. No pertinent surgical history.     Home Meds:  Medications Prior to Admission   Medication Sig Dispense Refill Last Dose   • albuterol sulfate  (90 Base) MCG/ACT inhaler Inhale 2 puffs Every 4 (Four) Hours As Needed for Wheezing or Shortness of Air. 18 g 0    • benzonatate (TESSALON) 200 MG capsule Take 1 capsule by mouth 3 (Three) Times a Day As Needed for Cough. 15 capsule 0    • doxycycline (VIBRAMYCIN) 100 MG capsule Take 1 capsule by mouth 2 (Two) Times a Day. 20 capsule 0 11/15/2020 at Unknown time   • methylPREDNISolone (MEDROL) 4 MG dose pack Take as directed on package instructions. 21 tablet 0 11/15/2020 at Unknown time       Allergies:  No Known Allergies    Social History:   Social History     Socioeconomic History   • Marital status: Single     Spouse name: Not on file   • Number of children: Not on file   • Years of education: Not on file   • Highest education level: Not on file   Tobacco Use   • Smoking status: Never Smoker   • Smokeless tobacco: Never Used   • Tobacco comment: No vaping/ no passive   Substance and Sexual Activity   • Alcohol use: Yes     Comment: once week   • Drug use: Defer   • Sexual activity: Defer       Family History:  Family History   Problem Relation  "Age of Onset   • No Known Problems Mother    • No Known Problems Father        Physical Exam:  /79 (BP Location: Right arm, Patient Position: Lying)   Pulse 51   Temp 97.6 °F (36.4 °C) (Oral)   Resp 15   Ht 177.8 cm (70\")   Wt 83 kg (183 lb)   SpO2 97%   BMI 26.26 kg/m²  Body mass index is 26.26 kg/m². 97% 83 kg (183 lb)  Physical Exam  Vitals signs reviewed.   Constitutional:       Appearance: He is ill-appearing.   Pulmonary:      Effort: Respiratory distress present.      Breath sounds: Wheezing and rhonchi present.   Neurological:      Mental Status: He is alert.         LABS:  Lab Results   Component Value Date    CALCIUM 9.2 11/15/2020     Results from last 7 days   Lab Units 11/15/20  1304   SODIUM mmol/L 132*   POTASSIUM mmol/L 4.2   CHLORIDE mmol/L 95*   CO2 mmol/L 25.0   BUN mg/dL 22*   CREATININE mg/dL 0.81   GLUCOSE mg/dL 126*   CALCIUM mg/dL 9.2   WBC 10*3/mm3 13.50*   HEMOGLOBIN g/dL 14.7   PLATELETS 10*3/mm3 343   ALT (SGPT) U/L 26   AST (SGOT) U/L 18     No results found for: CKTOTAL, CKMB, CKMBINDEX, TROPONINI, TROPONINT                              No results found for: TSH  Estimated Creatinine Clearance: 116.7 mL/min (by C-G formula based on SCr of 0.81 mg/dL).         Imaging:  Imaging Results (Last 24 Hours)     Procedure Component Value Units Date/Time    CT Chest Pulmonary Embolism [[id number]] Collected: 11/15/20 1508     Updated: 11/15/20 1527    Narrative:         DATE OF EXAM:  11/15/2020 2:42 PM     PROCEDURE:  CT CHEST PULMONARY EMBOLISM-     INDICATIONS:   PE suspected, high prob     COMPARISON:   No Comparisons Available     TECHNIQUE:  Routine transaxial slices were obtained through chest after  administration of intravenous 100 ml of Isovue 370. Reconstructed  coronal and sagittal images were also obtained. Automated exposure  control and iterative reconstruction methods were used.      FINDINGS:  There is excellent pulmonary arterial opacification and there are " no  filling defects to suggest pulmonary embolic disease.     There are diffuse, bilateral upper and lower lobe areas of groundglass  attenuation consistent with pneumonia. The pattern would be consistent  with Covid 19 pneumonia. There are right hilar, right paratracheal and  subcarinal prominent lymph nodes likely reactive given the presence of  pneumonia.     Limited evaluation of the upper solid abdominal structures demonstrates  right renal cystic change.        Impression:      No evidence of pulmonary embolism. Multifocal pneumonia in a  distribution consistent with Covid 19 pneumonia     Electronically Signed By-Leif Phoenix MD On:11/15/2020 3:10 PM  This report was finalized on [id number] by  Leif Phoenix MD.            Current Meds:   SCHEDULE  azithromycin, 500 mg, Intravenous, Q24H  dexamethasone, 6 mg, Intravenous, Daily  enoxaparin, 40 mg, Subcutaneous, Q24H      Infusions  sodium chloride, 75 mL/hr, Last Rate: 75 mL/hr (11/16/20 0240)      PRNs  •  albuterol sulfate HFA  •  benzonatate        JESUS Sebastian  11/16/2020  06:45 EST          Electronically signed by Kevin Velasquez MD at 11/16/20 1044

## 2020-11-16 NOTE — CONSULTS
Group: [facility]         CONSULT NOTE    Patient Identification:  Miah Carroll  60 y.o.  male  1960  [id number]            Requesting physician: Attending physician    Reason for Consultation: Covid 19 pneumonia      History of Present Illness:    60 year old male who presents to ED with complaints of shortness of breath, non productive cough, diarrhea, chills, and pain in the chest. He checked his oxygen saturation at home and it was in the 80's. Patient has felt ill for over a week with progressive shortness of air. Today he is ill looking and short of breath in the bed while talking. Patient states he has not had appetite and has not eaten or drink well in the last week    Assessment:    Hypoxia due to Covid 19 pneumonia  No PE on CAT scan  Diffuse bilateral alveolar infiltrate secondary to COVID-19 infection  History of reactive airway disease      Recommendations:    Remdesivir added  Antibiotics azithromycin and unasyn  Steroids decadron 2 mg IV q 8  NS IV at 75  Antiinflammatory agents vit c, zinc, theophylline, and mucomyst  Anticoagulation enoxaparin per Covid protocol            Review of Sytems:  Review of Systems   Constitutional: Positive for activity change, appetite change, diaphoresis, fatigue and fever.   Respiratory: Positive for cough and shortness of breath.        Past Medical History:  Past Medical History:   Diagnosis Date   • Gout    • Reactive airway disease        Past Surgical History:  History reviewed. No pertinent surgical history.     Home Meds:  Medications Prior to Admission   Medication Sig Dispense Refill Last Dose   • albuterol sulfate  (90 Base) MCG/ACT inhaler Inhale 2 puffs Every 4 (Four) Hours As Needed for Wheezing or Shortness of Air. 18 g 0    • benzonatate (TESSALON) 200 MG capsule Take 1 capsule by mouth 3 (Three) Times a Day As Needed for Cough. 15 capsule 0    • doxycycline (VIBRAMYCIN) 100 MG capsule Take 1 capsule by mouth 2 (Two) Times a  "Day. 20 capsule 0 11/15/2020 at Unknown time   • methylPREDNISolone (MEDROL) 4 MG dose pack Take as directed on package instructions. 21 tablet 0 11/15/2020 at Unknown time       Allergies:  No Known Allergies    Social History:   Social History     Socioeconomic History   • Marital status: Single     Spouse name: Not on file   • Number of children: Not on file   • Years of education: Not on file   • Highest education level: Not on file   Tobacco Use   • Smoking status: Never Smoker   • Smokeless tobacco: Never Used   • Tobacco comment: No vaping/ no passive   Substance and Sexual Activity   • Alcohol use: Yes     Comment: once week   • Drug use: Defer   • Sexual activity: Defer       Family History:  Family History   Problem Relation Age of Onset   • No Known Problems Mother    • No Known Problems Father        Physical Exam:  /79 (BP Location: Right arm, Patient Position: Lying)   Pulse 51   Temp 97.6 °F (36.4 °C) (Oral)   Resp 15   Ht 177.8 cm (70\")   Wt 83 kg (183 lb)   SpO2 97%   BMI 26.26 kg/m²  Body mass index is 26.26 kg/m². 97% 83 kg (183 lb)  Physical Exam  Vitals signs reviewed.   Constitutional:       Appearance: He is ill-appearing.   Pulmonary:      Effort: Respiratory distress present.      Breath sounds: Wheezing and rhonchi present.   Neurological:      Mental Status: He is alert.         LABS:  Lab Results   Component Value Date    CALCIUM 9.2 11/15/2020     Results from last 7 days   Lab Units 11/15/20  1304   SODIUM mmol/L 132*   POTASSIUM mmol/L 4.2   CHLORIDE mmol/L 95*   CO2 mmol/L 25.0   BUN mg/dL 22*   CREATININE mg/dL 0.81   GLUCOSE mg/dL 126*   CALCIUM mg/dL 9.2   WBC 10*3/mm3 13.50*   HEMOGLOBIN g/dL 14.7   PLATELETS 10*3/mm3 343   ALT (SGPT) U/L 26   AST (SGOT) U/L 18     No results found for: CKTOTAL, CKMB, CKMBINDEX, TROPONINI, TROPONINT                              No results found for: TSH  Estimated Creatinine Clearance: 116.7 mL/min (by C-G formula based on SCr of 0.81 " mg/dL).         Imaging:  Imaging Results (Last 24 Hours)     Procedure Component Value Units Date/Time    CT Chest Pulmonary Embolism [[id number]] Collected: 11/15/20 1508     Updated: 11/15/20 1527    Narrative:         DATE OF EXAM:  11/15/2020 2:42 PM     PROCEDURE:  CT CHEST PULMONARY EMBOLISM-     INDICATIONS:   PE suspected, high prob     COMPARISON:   No Comparisons Available     TECHNIQUE:  Routine transaxial slices were obtained through chest after  administration of intravenous 100 ml of Isovue 370. Reconstructed  coronal and sagittal images were also obtained. Automated exposure  control and iterative reconstruction methods were used.      FINDINGS:  There is excellent pulmonary arterial opacification and there are no  filling defects to suggest pulmonary embolic disease.     There are diffuse, bilateral upper and lower lobe areas of groundglass  attenuation consistent with pneumonia. The pattern would be consistent  with Covid 19 pneumonia. There are right hilar, right paratracheal and  subcarinal prominent lymph nodes likely reactive given the presence of  pneumonia.     Limited evaluation of the upper solid abdominal structures demonstrates  right renal cystic change.        Impression:      No evidence of pulmonary embolism. Multifocal pneumonia in a  distribution consistent with Covid 19 pneumonia     Electronically Signed By-Leif Phoenix MD On:11/15/2020 3:10 PM  This report was finalized on [id number] by  Leif Phoenix MD.            Current Meds:   SCHEDULE  azithromycin, 500 mg, Intravenous, Q24H  dexamethasone, 6 mg, Intravenous, Daily  enoxaparin, 40 mg, Subcutaneous, Q24H      Infusions  sodium chloride, 75 mL/hr, Last Rate: 75 mL/hr (11/16/20 0240)      PRNs  •  albuterol sulfate HFA  •  benzonatate        JESUS Sebastian  11/16/2020  06:45 EST

## 2020-11-16 NOTE — PLAN OF CARE
Goal Outcome Evaluation:  Plan of Care Reviewed With: patient  Progress: no change  Outcome Summary: patient resting in bed.  complains of generalized weakness.  2L nasal cannula.  will continue to monitor.

## 2020-11-17 LAB
ALBUMIN SERPL-MCNC: 2.9 G/DL (ref 3.5–5.2)
ALBUMIN/GLOB SERPL: 1.2 G/DL
ALP SERPL-CCNC: 74 U/L (ref 39–117)
ALT SERPL W P-5'-P-CCNC: 30 U/L (ref 1–41)
ANION GAP SERPL CALCULATED.3IONS-SCNC: 10 MMOL/L (ref 5–15)
AST SERPL-CCNC: 18 U/L (ref 1–40)
BASOPHILS # BLD AUTO: 0 10*3/MM3 (ref 0–0.2)
BASOPHILS NFR BLD AUTO: 0.1 % (ref 0–1.5)
BILIRUB CONJ SERPL-MCNC: <0.2 MG/DL (ref 0–0.3)
BILIRUB SERPL-MCNC: 0.5 MG/DL (ref 0–1.2)
BUN SERPL-MCNC: 20 MG/DL (ref 6–20)
BUN/CREAT SERPL: 31.7 (ref 7–25)
CALCIUM SPEC-SCNC: 8.4 MG/DL (ref 8.6–10.5)
CHLORIDE SERPL-SCNC: 101 MMOL/L (ref 98–107)
CK SERPL-CCNC: 55 U/L (ref 20–200)
CO2 SERPL-SCNC: 26 MMOL/L (ref 22–29)
CREAT SERPL-MCNC: 0.63 MG/DL (ref 0.76–1.27)
CRP SERPL-MCNC: 8.74 MG/DL (ref 0–0.5)
D DIMER PPP FEU-MCNC: 0.62 MG/L (FEU) (ref 0–0.59)
DEPRECATED RDW RBC AUTO: 45.5 FL (ref 37–54)
EOSINOPHIL # BLD AUTO: 0 10*3/MM3 (ref 0–0.4)
EOSINOPHIL NFR BLD AUTO: 0 % (ref 0.3–6.2)
ERYTHROCYTE [DISTWIDTH] IN BLOOD BY AUTOMATED COUNT: 13.6 % (ref 12.3–15.4)
FERRITIN SERPL-MCNC: 876.3 NG/ML (ref 30–400)
FIBRINOGEN PPP-MCNC: 573 MG/DL (ref 210–450)
GFR SERPL CREATININE-BSD FRML MDRD: 131 ML/MIN/1.73
GLOBULIN UR ELPH-MCNC: 2.5 GM/DL
GLUCOSE SERPL-MCNC: 117 MG/DL (ref 65–99)
HCT VFR BLD AUTO: 39.6 % (ref 37.5–51)
HGB BLD-MCNC: 13.6 G/DL (ref 13–17.7)
LDH SERPL-CCNC: 151 U/L (ref 135–225)
LYMPHOCYTES # BLD AUTO: 0.6 10*3/MM3 (ref 0.7–3.1)
LYMPHOCYTES NFR BLD AUTO: 12.7 % (ref 19.6–45.3)
MCH RBC QN AUTO: 32.9 PG (ref 26.6–33)
MCHC RBC AUTO-ENTMCNC: 34.5 G/DL (ref 31.5–35.7)
MCV RBC AUTO: 95.5 FL (ref 79–97)
MONOCYTES # BLD AUTO: 0.4 10*3/MM3 (ref 0.1–0.9)
MONOCYTES NFR BLD AUTO: 7.9 % (ref 5–12)
NEUTROPHILS NFR BLD AUTO: 3.9 10*3/MM3 (ref 1.7–7)
NEUTROPHILS NFR BLD AUTO: 79.3 % (ref 42.7–76)
NRBC BLD AUTO-RTO: 0 /100 WBC (ref 0–0.2)
PLATELET # BLD AUTO: 364 10*3/MM3 (ref 140–450)
PMV BLD AUTO: 7.5 FL (ref 6–12)
POTASSIUM SERPL-SCNC: 4.4 MMOL/L (ref 3.5–5.2)
PROT SERPL-MCNC: 5.4 G/DL (ref 6–8.5)
QT INTERVAL: 392 MS
RBC # BLD AUTO: 4.14 10*6/MM3 (ref 4.14–5.8)
SODIUM SERPL-SCNC: 137 MMOL/L (ref 136–145)
WBC # BLD AUTO: 4.9 10*3/MM3 (ref 3.4–10.8)

## 2020-11-17 PROCEDURE — 80053 COMPREHEN METABOLIC PANEL: CPT | Performed by: NURSE PRACTITIONER

## 2020-11-17 PROCEDURE — 94799 UNLISTED PULMONARY SVC/PX: CPT

## 2020-11-17 PROCEDURE — 82248 BILIRUBIN DIRECT: CPT | Performed by: NURSE PRACTITIONER

## 2020-11-17 PROCEDURE — 82728 ASSAY OF FERRITIN: CPT | Performed by: NURSE PRACTITIONER

## 2020-11-17 PROCEDURE — 63710000001 DEXAMETHASONE PER 0.25 MG: Performed by: NURSE PRACTITIONER

## 2020-11-17 PROCEDURE — 82550 ASSAY OF CK (CPK): CPT | Performed by: NURSE PRACTITIONER

## 2020-11-17 PROCEDURE — 99232 SBSQ HOSP IP/OBS MODERATE 35: CPT | Performed by: INTERNAL MEDICINE

## 2020-11-17 PROCEDURE — 85025 COMPLETE CBC W/AUTO DIFF WBC: CPT | Performed by: NURSE PRACTITIONER

## 2020-11-17 PROCEDURE — 85379 FIBRIN DEGRADATION QUANT: CPT | Performed by: NURSE PRACTITIONER

## 2020-11-17 PROCEDURE — 86140 C-REACTIVE PROTEIN: CPT | Performed by: NURSE PRACTITIONER

## 2020-11-17 PROCEDURE — 83615 LACTATE (LD) (LDH) ENZYME: CPT | Performed by: NURSE PRACTITIONER

## 2020-11-17 PROCEDURE — 25010000003 AMPICILLIN-SULBACTAM PER 1.5 G: Performed by: NURSE PRACTITIONER

## 2020-11-17 PROCEDURE — 85384 FIBRINOGEN ACTIVITY: CPT | Performed by: NURSE PRACTITIONER

## 2020-11-17 PROCEDURE — 25010000002 ENOXAPARIN PER 10 MG: Performed by: NURSE PRACTITIONER

## 2020-11-17 PROCEDURE — 25010000002 DEXAMETHASONE PER 1 MG: Performed by: NURSE PRACTITIONER

## 2020-11-17 RX ORDER — DEXAMETHASONE 4 MG/1
2 TABLET ORAL EVERY 8 HOURS SCHEDULED
Status: DISCONTINUED | OUTPATIENT
Start: 2020-11-17 | End: 2020-11-18 | Stop reason: HOSPADM

## 2020-11-17 RX ORDER — AZITHROMYCIN 250 MG/1
250 TABLET, FILM COATED ORAL
Status: DISCONTINUED | OUTPATIENT
Start: 2020-11-17 | End: 2020-11-18 | Stop reason: HOSPADM

## 2020-11-17 RX ADMIN — DEXAMETHASONE SODIUM PHOSPHATE 2 MG: 4 INJECTION, SOLUTION INTRAMUSCULAR; INTRAVENOUS at 08:40

## 2020-11-17 RX ADMIN — DEXAMETHASONE SODIUM PHOSPHATE 2 MG: 4 INJECTION, SOLUTION INTRAMUSCULAR; INTRAVENOUS at 01:41

## 2020-11-17 RX ADMIN — GUAIFENESIN 600 MG: 600 TABLET, EXTENDED RELEASE ORAL at 20:44

## 2020-11-17 RX ADMIN — SODIUM CHLORIDE 125 ML/HR: 9 INJECTION, SOLUTION INTRAVENOUS at 07:47

## 2020-11-17 RX ADMIN — DEXAMETHASONE 2 MG: 4 TABLET ORAL at 14:02

## 2020-11-17 RX ADMIN — DEXAMETHASONE 2 MG: 4 TABLET ORAL at 21:09

## 2020-11-17 RX ADMIN — PANTOPRAZOLE SODIUM 40 MG: 40 TABLET, DELAYED RELEASE ORAL at 05:03

## 2020-11-17 RX ADMIN — THEOPHYLLINE ANHYDROUS 300 MG: 300 CAPSULE, EXTENDED RELEASE ORAL at 08:40

## 2020-11-17 RX ADMIN — AMPICILLIN SODIUM AND SULBACTAM SODIUM 1.5 G: 1; .5 INJECTION, POWDER, FOR SOLUTION INTRAMUSCULAR; INTRAVENOUS at 08:40

## 2020-11-17 RX ADMIN — AMPICILLIN SODIUM AND SULBACTAM SODIUM 1.5 G: 1; .5 INJECTION, POWDER, FOR SOLUTION INTRAMUSCULAR; INTRAVENOUS at 14:02

## 2020-11-17 RX ADMIN — OXYCODONE HYDROCHLORIDE AND ACETAMINOPHEN 1000 MG: 500 TABLET ORAL at 08:39

## 2020-11-17 RX ADMIN — Medication 600 MG: at 08:40

## 2020-11-17 RX ADMIN — Medication 600 MG: at 20:44

## 2020-11-17 RX ADMIN — BUDESONIDE AND FORMOTEROL FUMARATE DIHYDRATE 2 PUFF: 160; 4.5 AEROSOL RESPIRATORY (INHALATION) at 20:14

## 2020-11-17 RX ADMIN — AMPICILLIN SODIUM AND SULBACTAM SODIUM 1.5 G: 1; .5 INJECTION, POWDER, FOR SOLUTION INTRAMUSCULAR; INTRAVENOUS at 19:15

## 2020-11-17 RX ADMIN — AMPICILLIN SODIUM AND SULBACTAM SODIUM 1.5 G: 1; .5 INJECTION, POWDER, FOR SOLUTION INTRAMUSCULAR; INTRAVENOUS at 01:41

## 2020-11-17 RX ADMIN — AZITHROMYCIN 250 MG: 250 TABLET, FILM COATED ORAL at 12:31

## 2020-11-17 RX ADMIN — SODIUM CHLORIDE 100 MG: 9 INJECTION, SOLUTION INTRAVENOUS at 12:31

## 2020-11-17 RX ADMIN — GUAIFENESIN 600 MG: 600 TABLET, EXTENDED RELEASE ORAL at 08:39

## 2020-11-17 RX ADMIN — BUDESONIDE AND FORMOTEROL FUMARATE DIHYDRATE 2 PUFF: 160; 4.5 AEROSOL RESPIRATORY (INHALATION) at 07:01

## 2020-11-17 RX ADMIN — ZINC SULFATE 220 MG (50 MG) CAPSULE 220 MG: CAPSULE at 08:40

## 2020-11-17 RX ADMIN — ENOXAPARIN SODIUM 40 MG: 40 INJECTION SUBCUTANEOUS at 15:09

## 2020-11-17 NOTE — PLAN OF CARE
Problem: Adult Inpatient Plan of Care  Goal: Plan of Care Review  Outcome: Ongoing, Progressing  Flowsheets (Taken 11/17/2020 0311 by Erica Jiménez LPN)  Plan of Care Reviewed With: patient  Goal: Patient-Specific Goal (Individualized)  Outcome: Ongoing, Progressing  Goal: Absence of Hospital-Acquired Illness or Injury  Outcome: Ongoing, Progressing  Intervention: Identify and Manage Fall Risk  Recent Flowsheet Documentation  Taken 11/17/2020 1500 by Eloy Varghese RN  Safety Promotion/Fall Prevention:   safety round/check completed   nonskid shoes/slippers when out of bed  Taken 11/17/2020 1300 by Eloy Varghese RN  Safety Promotion/Fall Prevention:   safety round/check completed   nonskid shoes/slippers when out of bed  Taken 11/17/2020 1100 by Eloy Varghese RN  Safety Promotion/Fall Prevention:   safety round/check completed   nonskid shoes/slippers when out of bed  Taken 11/17/2020 0900 by Eloy Varghese RN  Safety Promotion/Fall Prevention:   safety round/check completed   nonskid shoes/slippers when out of bed  Taken 11/17/2020 0702 by Eloy Varghese RN  Safety Promotion/Fall Prevention:   safety round/check completed   nonskid shoes/slippers when out of bed  Intervention: Prevent Skin Injury  Recent Flowsheet Documentation  Taken 11/17/2020 0702 by Eloy Varghese RN  Body Position: position changed independently  Intervention: Prevent Infection  Recent Flowsheet Documentation  Taken 11/17/2020 1500 by Eloy Varghese RN  Infection Prevention:   single patient room provided   rest/sleep promoted  Taken 11/17/2020 1300 by Eloy Varghese RN  Infection Prevention:   single patient room provided   rest/sleep promoted  Taken 11/17/2020 1100 by Eloy Varghese RN  Infection Prevention:   rest/sleep promoted   single patient room provided  Taken 11/17/2020 0900 by Eloy Varghese RN  Infection Prevention:   single patient room provided   rest/sleep promoted  Taken 11/17/2020 0702 by Eloy Varghese RN  Infection  Prevention:   single patient room provided   rest/sleep promoted  Goal: Optimal Comfort and Wellbeing  Outcome: Ongoing, Progressing  Intervention: Provide Person-Centered Care  Recent Flowsheet Documentation  Taken 11/17/2020 0702 by Eloy Varghese RN  Trust Relationship/Rapport:   care explained   thoughts/feelings acknowledged  Goal: Readiness for Transition of Care  Outcome: Ongoing, Progressing     Problem: Breathing Pattern Ineffective  Goal: Effective Breathing Pattern  Outcome: Ongoing, Progressing  Intervention: Promote Improved Breathing Pattern  Recent Flowsheet Documentation  Taken 11/17/2020 0702 by Eloy Varghese, RN  Supportive Measures: active listening utilized  Head of Bed (HOB): HOB at 20-30 degrees  Airway/Ventilation Management: airway patency maintained   Goal Outcome Evaluation:  Plan of Care Reviewed With: patient  Progress: improving     Pt rested throughout the shift. Pt had no complaints. 2nd Dose of Remdesivir given. Pt on room air. Will continue to monitor.

## 2020-11-17 NOTE — PLAN OF CARE
Goal Outcome Evaluation:  Plan of Care Reviewed With: patient  Progress: improving  Outcome Summary: patient rested well throughout the night.  first dose of Remdesivir was given 11/16/20.  patient remains on 2L O2.  will continue to monitor.

## 2020-11-17 NOTE — PROGRESS NOTES
Daily Progress Note    Pneumonia due to COVID-19 virus    Reactive airway disease    Hyponatremia    Nausea    Assessment:     Hypoxia due to Covid 19 pneumonia improved  No PE on CAT scan  Diffuse bilateral alveolar infiltrate secondary to COVID-19 infection  History of reactive airway disease        Recommendations:     Remdesivir added  Antibiotics azithromycin and unasyn  Steroids decadron 2 mg IV q 8  NS IV at 75  Antiinflammatory agents vit c, zinc, theophylline, and mucomyst  Anticoagulation enoxaparin per Covid protocol        Subjective     Patient feels better today; can walk to the bathroom without resting and trying to eat and push fluids    Objective                   LOS: 0 days       Subjective     Patient feels better today; can walk to the bathroom without resting and trying to eat and push fluids    Objective     Vital signs for last 24 hours:  Vitals:    11/16/20 1930 11/16/20 2337 11/17/20 0346 11/17/20 0701   BP: 124/83 132/80 148/82    BP Location: Right arm Right arm Right arm    Patient Position: Lying Lying Lying    Pulse: 52 (!) 46 (!) 44 (!) 45   Resp: 16 18 15 16   Temp: 98 °F (36.7 °C) 97.3 °F (36.3 °C) 98.2 °F (36.8 °C)    TempSrc: Oral Oral Oral    SpO2: 94% 95% 96% 97%   Weight:       Height:           Intake/Output last 3 shifts:  I/O last 3 completed shifts:  In: 2120 [P.O.:120; I.V.:2000]  Out: -   Intake/Output this shift:  No intake/output data recorded.      Radiology  Imaging Results (Last 24 Hours)     ** No results found for the last 24 hours. **          Labs:  Results from last 7 days   Lab Units 11/17/20  0442   WBC 10*3/mm3 4.90   HEMOGLOBIN g/dL 13.6   HEMATOCRIT % 39.6   PLATELETS 10*3/mm3 364     Results from last 7 days   Lab Units 11/17/20  0442   SODIUM mmol/L 137   POTASSIUM mmol/L 4.4   CHLORIDE mmol/L 101   CO2 mmol/L 26.0   BUN mg/dL 20   CREATININE mg/dL 0.63*   CALCIUM mg/dL 8.4*   BILIRUBIN mg/dL 0.5   ALK PHOS U/L 74   ALT (SGPT) U/L 30   AST (SGOT) U/L 18    GLUCOSE mg/dL 117*         Results from last 7 days   Lab Units 11/17/20  0442 11/16/20  1018 11/16/20  0624   ALBUMIN g/dL 2.90* 3.00* 3.10*     Results from last 7 days   Lab Units 11/17/20  0442   CK TOTAL U/L 55                           Meds:   SCHEDULE  Acetylcysteine, 600 mg, Oral, BID  ampicillin-sulbactam, 1.5 g, Intravenous, Q6H  azithromycin, 250 mg, Oral, Q24H  budesonide-formoterol, 2 puff, Inhalation, BID - RT  dexamethasone, 2 mg, Oral, Q8H  enoxaparin, 40 mg, Subcutaneous, Q24H  guaiFENesin, 600 mg, Oral, Q12H  pantoprazole, 40 mg, Oral, Q AM  remdesivir, 100 mg, Intravenous, Q24H  theophylline, 300 mg, Oral, Q24H  vitamin C, 1,000 mg, Oral, Daily  zinc sulfate, 220 mg, Oral, Daily      Infusions  Pharmacy Consult - Remdesivir,   sodium chloride, 125 mL/hr, Last Rate: 125 mL/hr (11/17/20 0747)      PRNs  albuterol sulfate HFA  •  benzonatate  •  ondansetron  •  Pharmacy Consult - Remdesivir    Physical Exam:  Physical Exam  Vitals signs reviewed.   Pulmonary:      Breath sounds: Rhonchi present.   Skin:     General: Skin is warm and dry.   Neurological:      Mental Status: He is alert and oriented to person, place, and time.         ROS  Review of Systems   Constitutional: Positive for activity change and fatigue.   Respiratory: Positive for cough and shortness of breath.

## 2020-11-17 NOTE — PROGRESS NOTES
"      [facility] Medicine Services Daily Progress Note      Hospitalist Team  LOS 0 days      Patient Care Team:  Emiliana Bruce MD as PCP - General (Family Medicine)    Patient Location: 307/1      Subjective   Subjective     Chief Complaint / Subjective  Chief Complaint   Patient presents with   • Shortness of Breath     covid +         Brief Synopsis of Hospital Course/HPI  Patient is a 60 year old male who presented to the ED with vague complaints of \" feeling bad\", nausea, vomiting and diarrhea since last week. Patient reports he was tested for COVID on 11/11/2020 but just recently received his results. Patient does reports his symptoms have been constant and moderate. He has had associated fever. He denies any cough or shortness of breath     Per records he was diagnosed with Covid-19 at [facility]. He was tested 11/11/20 but did not get results for a few days. He was on po Doxycycline and po steroids but seemed to indicate he did not take them. Upon exam he was not wearing oxygen and did not appear to be in distress .     Patient was admitted with consult for Dr. Velasquez at patient's PCP request.       11/17/2020- PT reports his nausea and diarrhea have improved. Denies any shortness of breath.       Review of Systems   Constitution: Positive for malaise/fatigue.   Respiratory: Negative for cough and shortness of breath.    Gastrointestinal: Positive for heartburn and nausea. Negative for abdominal pain and vomiting.         Objective   Objective      Vital Signs  Temp:  [97.3 °F (36.3 °C)-98.2 °F (36.8 °C)] 98.2 °F (36.8 °C)  Heart Rate:  [44-57] 45  Resp:  [15-20] 16  BP: (115-148)/(71-83) 148/82  Oxygen Therapy  SpO2: 97 %  Pulse Oximetry Type: Intermittent  Device (Oxygen Therapy): room air  Flow (L/min): 2  Flowsheet Rows      First Filed Value   Admission Height  177.8 cm (70\") Documented at 11/15/2020 1227   Admission Weight  83.9 kg (185 lb) Documented at 11/15/2020 1227        Intake & " Output (last 3 days)       11/14 0701 - 11/15 0700 11/15 0701 - 11/16 0700 11/16 0701 - 11/17 0700 11/17 0701 - 11/18 0700    P.O.   120     I.V. (mL/kg)  1000 (12) 1000 (12)     Total Intake(mL/kg)  1000 (12) 1120 (13.5)     Net  +1000 +1120                 Lines, Drains & Airways    Active LDAs     Name:   Placement date:   Placement time:   Site:   Days:    Peripheral IV 11/15/20 1239 Left Forearm   11/15/20    1239    Forearm   2                  Physical Exam:    Physical Exam  Vitals signs reviewed.   Constitutional:       General: He is not in acute distress.     Appearance: He is normal weight. He is not toxic-appearing.   HENT:      Head: Normocephalic and atraumatic.      Mouth/Throat:      Mouth: Mucous membranes are moist.   Eyes:      General: No scleral icterus.     Pupils: Pupils are equal, round, and reactive to light.   Cardiovascular:      Rate and Rhythm: Normal rate and regular rhythm.   Pulmonary:      Effort: Pulmonary effort is normal. No respiratory distress.   Neurological:      Mental Status: He is alert.   Psychiatric:         Mood and Affect: Mood normal.         Behavior: Behavior normal.               Procedures:              Results Review:     I reviewed the patient's new clinical results.      Lab Results (last 24 hours)     Procedure Component Value Units Date/Time    Blood Culture - Blood, Arm, Left [[id number]] Collected: 11/15/20 1304    Specimen: Blood from Arm, Left Updated: 11/17/20 1315     Blood Culture No growth at 2 days    Ferritin [[id number]]  (Abnormal) Collected: 11/17/20 0442    Specimen: Blood Updated: 11/17/20 0544     Ferritin 876.30 ng/mL     Narrative:      Results may be falsely decreased if patient taking Biotin.      Comprehensive Metabolic Panel [[id number]]  (Abnormal) Collected: 11/17/20 0442    Specimen: Blood Updated: 11/17/20 0542     Glucose 117 mg/dL      BUN 20 mg/dL      Creatinine 0.63 mg/dL      Sodium 137 mmol/L      Potassium 4.4 mmol/L       Chloride 101 mmol/L      CO2 26.0 mmol/L      Calcium 8.4 mg/dL      Total Protein 5.4 g/dL      Albumin 2.90 g/dL      ALT (SGPT) 30 U/L      AST (SGOT) 18 U/L      Alkaline Phosphatase 74 U/L      Total Bilirubin 0.5 mg/dL      eGFR Non African Amer 131 mL/min/1.73      Globulin 2.5 gm/dL      A/G Ratio 1.2 g/dL      BUN/Creatinine Ratio 31.7     Anion Gap 10.0 mmol/L     Narrative:      GFR Normal >60  Chronic Kidney Disease <60  Kidney Failure <15      CK [[id number]]  (Normal) Collected: 11/17/20 0442    Specimen: Blood Updated: 11/17/20 0542     Creatine Kinase 55 U/L     C-reactive Protein [[id number]]  (Abnormal) Collected: 11/17/20 0442    Specimen: Blood Updated: 11/17/20 0542     C-Reactive Protein 8.74 mg/dL     Lactate Dehydrogenase [[id number]]  (Normal) Collected: 11/17/20 0442    Specimen: Blood Updated: 11/17/20 0542      U/L     Bilirubin, Direct [[id number]]  (Normal) Collected: 11/17/20 0442    Specimen: Blood Updated: 11/17/20 0542     Bilirubin, Direct <0.2 mg/dL     Fibrinogen [[id number]]  (Abnormal) Collected: 11/17/20 0442    Specimen: Blood Updated: 11/17/20 0524     Fibrinogen 573 mg/dL     D-dimer, Quantitative [[id number]]  (Abnormal) Collected: 11/17/20 0442    Specimen: Blood Updated: 11/17/20 0524     D-Dimer, Quantitative 0.62 mg/L (FEU)     Narrative:      Reference Range  --------------------------------------------------------------------     < 0.50   Negative Predictive Value  0.50-0.59   Indeterminate    >= 0.60   Probable VTE             A very low percentage of patients with DVT may yield D-Dimer results   below the cut-off of 0.50 mg/L FEU.  This is known to be more   prevalent in patients with distal DVT.             Results of this test should always be interpreted in conjunction with   the patient's medical history, clinical presentation and other   findings.  Clinical diagnosis should not be based on the result of   IN[place]OV[place] D-Dimer alone.    CBC & Differential  [[id number]]  (Abnormal) Collected: 11/17/20 0442    Specimen: Blood Updated: 11/17/20 0520    Narrative:      The following orders were created for panel order CBC & Differential.  Procedure                               Abnormality         Status                     ---------                               -----------         ------                     CBC Auto Differential[[id number]]        Abnormal            Final result                 Please view results for these tests on the individual orders.    CBC Auto Differential [[id number]]  (Abnormal) Collected: 11/17/20 0442    Specimen: Blood Updated: 11/17/20 0520     WBC 4.90 10*3/mm3      RBC 4.14 10*6/mm3      Hemoglobin 13.6 g/dL      Hematocrit 39.6 %      MCV 95.5 fL      MCH 32.9 pg      MCHC 34.5 g/dL      RDW 13.6 %      RDW-SD 45.5 fl      MPV 7.5 fL      Platelets 364 10*3/mm3      Neutrophil % 79.3 %      Lymphocyte % 12.7 %      Monocyte % 7.9 %      Eosinophil % 0.0 %      Basophil % 0.1 %      Neutrophils, Absolute 3.90 10*3/mm3      Lymphocytes, Absolute 0.60 10*3/mm3      Monocytes, Absolute 0.40 10*3/mm3      Eosinophils, Absolute 0.00 10*3/mm3      Basophils, Absolute 0.00 10*3/mm3      nRBC 0.0 /100 WBC         No results found for: HGBA1C            No results found for: LIPASE  No results found for: CHOL, CHLPL, TRIG, HDL, LDL, LDLDIRECT    No results found for: INTRAOP, PREDX, FINALDX, COMDX    Microbiology Results (last 10 days)     Procedure Component Value - Date/Time    Blood Culture - Blood, Arm, Left [[id number]] Collected: 11/15/20 1304    Lab Status: Preliminary result Specimen: Blood from Arm, Left Updated: 11/17/20 1315     Blood Culture No growth at 2 days    COVID-19,[facility],Call Lab for Collection Requirements - , [[id number]]  (Abnormal) Collected: 11/11/20 0919    Lab Status: Final result Updated: 11/15/20 1206    COVID-19,[facility],NP/OP Swab in Transport Media or ESwab 72 HR TAT - Swab, Anterior nasal [[id number]]   (Abnormal) Collected: 11/11/20 0901    Lab Status: Final result Specimen: Swab from Anterior nasal Updated: 11/13/20 0711    Narrative:      The following orders were created for panel order COVID-19,[facility],NP/OP Swab in Transport Media or ESwab 72 HR TAT - Swab, Anterior nasal.  Procedure                               Abnormality         Status                     ---------                               -----------         ------                     COVID-19,[facility] ROUTINE...[[id number]]  Abnormal            Final result                 Please view results for these tests on the individual orders.    COVID-19,[facility] ROUTINE, NP/OP SWAB IN TRANSPORT MEDIA OR ESWAB 72 HR TAT - Swab, Anterior nasal [[id number]]  (Abnormal) Collected: 11/11/20 0901    Lab Status: Final result Specimen: Swab from Anterior nasal Updated: 11/13/20 0711     SARS-CoV-2, APOLINAR Detected     Comment: This nucleic acid amplification test was developed and its performance  characteristics determined by [employer]. Nucleic acid  amplification tests include PCR and TMA. This test has not been FDA  cleared or approved. This test has been authorized by FDA under an  Emergency Use Authorization (EUA). This test is only authorized for  the duration of time the declaration that circumstances exist  justifying the authorization of the emergency use of in vitro  diagnostic tests for detection of SARS-CoV-2 virus and/or diagnosis  of COVID-19 infection under section 564(b)(1) of the Act, 21 U.S.C.  360bbb-3(b) (1), unless the authorization is terminated or revoked  sooner.  When diagnostic testing is negative, the possibility of a false  negative result should be considered in the context of a patient's  recent exposures and the presence of clinical signs and symptoms  consistent with COVID-19. An individual without symptoms of COVID-19  and who is not shedding SARS-CoV-2 virus would expect to have a  negative (not detected) result in this assay.        Narrative:      Performed at:  01 - [facility]  [address], [facility], [place]  [id number]  : Alvaro Ramirez [facility], Phone:  [phone]          ECG/EMG Results (most recent)     Procedure Component Value Units Date/Time    ECG 12 Lead [[id number]] Collected: 11/15/20 1259     Updated: 11/15/20 1301     QT Interval 392 ms     Narrative:      HEART RATE= 74  bpm  RR Interval= 808  ms  VT Interval= 150  ms  P Horizontal Axis= -11  deg  P Front Axis= 55  deg  QRSD Interval= 88  ms  QT Interval= 392  ms  QRS Axis= 199  deg  T Wave Axis= 14  deg  - ABNORMAL ECG -  Sinus rhythm  Probable right ventricular hypertrophy  Electronically Signed By:   Date and Time of Study: 2020-11-15 12:59:58                    Ct Chest Pulmonary Embolism    Result Date: 11/15/2020  No evidence of pulmonary embolism. Multifocal pneumonia in a distribution consistent with Covid 19 pneumonia  Electronically Signed By-Leif Phoenix MD On:11/15/2020 3:10 PM This report was finalized on [id number] by  Leif Phoenix MD.          Xrays, labs reviewed personally by physician.    Medication Review:   I have reviewed the patient's current medication list      Scheduled Meds  Acetylcysteine, 600 mg, Oral, BID  ampicillin-sulbactam, 1.5 g, Intravenous, Q6H  azithromycin, 250 mg, Oral, Q24H  budesonide-formoterol, 2 puff, Inhalation, BID - RT  dexamethasone, 2 mg, Oral, Q8H  enoxaparin, 40 mg, Subcutaneous, Q24H  guaiFENesin, 600 mg, Oral, Q12H  pantoprazole, 40 mg, Oral, Q AM  remdesivir, 100 mg, Intravenous, Q24H  theophylline, 300 mg, Oral, Q24H  vitamin C, 1,000 mg, Oral, Daily  zinc sulfate, 220 mg, Oral, Daily        Meds Infusions  Pharmacy Consult - Remdesivir,   sodium chloride, 125 mL/hr, Last Rate: 125 mL/hr (11/17/20 1509)        Meds PRN  albuterol sulfate HFA  •  benzonatate  •  ondansetron  •  Pharmacy Consult - Remdesivir        Assessment/Plan   Assessment/Plan     Active Hospital Problems    Diagnosis  POA   •  **Pneumonia due to COVID-19 virus [U07.1, J12.89]  Yes   • Reactive airway disease [J45.909]  Yes   • Hyponatremia [E87.1]  Yes   • Nausea [R11.0]  Yes      Resolved Hospital Problems   No resolved problems to display.       MEDICAL DECISION MAKING COMPLEXITY BY PROBLEM:     Pneumonia due to Covid-19   - CT Chest reviewed and shows multifocal pneumonia   - continue IV steroids  - procalcitonin negative   - continue vitamin C and zinc sulfate   - on Unasyn and Remdesivir per pulmonology  - on room air      Elevated d- dimer   - likely d/t above  - CT PE protocol negative for PE      Hyponatremia likely d/t volume depletion- resolved   - continue IV fluids      Nausea, Reflux  - instructed patient to sit up right when eating or drinking and stay sitting up at least 30 minutes after eating  - improved with Protonix and Zofran      Reactive Airway disease   - likely exacerbated by above  - continue home inhalers   - pulmonology consulted in the ED     DVT Prophylaxis  - enoxaparin        VTE Prophylaxis -   Mechanical Order History:     None      Pharmalogical Order History:      Ordered     Dose Route Frequency Stop    11/16/20 0107  enoxaparin (LOVENOX) syringe 40 mg  Status:  Discontinued      40 mg SC Every 24 Hours 11/16/20 0911    11/16/20 0911  enoxaparin (LOVENOX) syringe 40 mg     Note to Pharmacy: Please use covid protocol for dosing    40 mg SC Every 24 Hours --                  Code Status -   Code Status and Medical Interventions:   Ordered at: 11/16/20 0108     Code Status:    CPR     Medical Interventions (Level of Support Prior to Arrest):    Full       This patient has been examined wearing appropriate Personal Protective Equipment and discussed with pulmonologist. 11/17/20        Discharge Planning  Home once ok with pulmonology       Electronically signed by Jaswant Elizondo DO, 11/17/20, 15:14 EST.  [facility] Hospitalist Team

## 2020-11-17 NOTE — PROGRESS NOTES
Continued Stay Note  HORACIO Kruse     Patient Name: Miah Carroll  MRN: [id number]  Today's Date: 11/17/2020    Admit Date: 11/15/2020    Discharge Plan     Row Name 11/17/20 1359       Plan    Plan Comments  DC barriers: IV Remdesivir, IV abx         April Cramer RN

## 2020-11-18 ENCOUNTER — READMISSION MANAGEMENT (OUTPATIENT)
Dept: CALL CENTER | Facility: HOSPITAL | Age: 60
End: 2020-11-18

## 2020-11-18 VITALS
BODY MASS INDEX: 26.23 KG/M2 | HEIGHT: 70 IN | HEART RATE: 48 BPM | RESPIRATION RATE: 15 BRPM | DIASTOLIC BLOOD PRESSURE: 89 MMHG | SYSTOLIC BLOOD PRESSURE: 156 MMHG | TEMPERATURE: 97.5 F | WEIGHT: 183.2 LBS | OXYGEN SATURATION: 94 %

## 2020-11-18 LAB
ALBUMIN SERPL-MCNC: 3 G/DL (ref 3.5–5.2)
ALBUMIN/GLOB SERPL: 1.3 G/DL
ALP SERPL-CCNC: 69 U/L (ref 39–117)
ALT SERPL W P-5'-P-CCNC: 32 U/L (ref 1–41)
ANION GAP SERPL CALCULATED.3IONS-SCNC: 10 MMOL/L (ref 5–15)
AST SERPL-CCNC: 20 U/L (ref 1–40)
BASOPHILS # BLD AUTO: 0 10*3/MM3 (ref 0–0.2)
BASOPHILS NFR BLD AUTO: 0.2 % (ref 0–1.5)
BILIRUB CONJ SERPL-MCNC: <0.2 MG/DL (ref 0–0.3)
BILIRUB SERPL-MCNC: 0.5 MG/DL (ref 0–1.2)
BUN SERPL-MCNC: 17 MG/DL (ref 6–20)
BUN/CREAT SERPL: 25.8 (ref 7–25)
CALCIUM SPEC-SCNC: 8.4 MG/DL (ref 8.6–10.5)
CHLORIDE SERPL-SCNC: 103 MMOL/L (ref 98–107)
CK SERPL-CCNC: 48 U/L (ref 20–200)
CO2 SERPL-SCNC: 25 MMOL/L (ref 22–29)
CREAT SERPL-MCNC: 0.66 MG/DL (ref 0.76–1.27)
CRP SERPL-MCNC: 4.56 MG/DL (ref 0–0.5)
DEPRECATED RDW RBC AUTO: 43.8 FL (ref 37–54)
EOSINOPHIL # BLD AUTO: 0 10*3/MM3 (ref 0–0.4)
EOSINOPHIL NFR BLD AUTO: 0 % (ref 0.3–6.2)
ERYTHROCYTE [DISTWIDTH] IN BLOOD BY AUTOMATED COUNT: 13.3 % (ref 12.3–15.4)
FERRITIN SERPL-MCNC: 926.9 NG/ML (ref 30–400)
GFR SERPL CREATININE-BSD FRML MDRD: 124 ML/MIN/1.73
GLOBULIN UR ELPH-MCNC: 2.4 GM/DL
GLUCOSE SERPL-MCNC: 125 MG/DL (ref 65–99)
HCT VFR BLD AUTO: 38.8 % (ref 37.5–51)
HGB BLD-MCNC: 13.4 G/DL (ref 13–17.7)
LYMPHOCYTES # BLD AUTO: 0.7 10*3/MM3 (ref 0.7–3.1)
LYMPHOCYTES NFR BLD AUTO: 14.7 % (ref 19.6–45.3)
MCH RBC QN AUTO: 32.9 PG (ref 26.6–33)
MCHC RBC AUTO-ENTMCNC: 34.4 G/DL (ref 31.5–35.7)
MCV RBC AUTO: 95.7 FL (ref 79–97)
MONOCYTES # BLD AUTO: 0.4 10*3/MM3 (ref 0.1–0.9)
MONOCYTES NFR BLD AUTO: 7.6 % (ref 5–12)
NEUTROPHILS NFR BLD AUTO: 3.7 10*3/MM3 (ref 1.7–7)
NEUTROPHILS NFR BLD AUTO: 77.5 % (ref 42.7–76)
NRBC BLD AUTO-RTO: 0 /100 WBC (ref 0–0.2)
PLATELET # BLD AUTO: 425 10*3/MM3 (ref 140–450)
PMV BLD AUTO: 7.9 FL (ref 6–12)
POTASSIUM SERPL-SCNC: 4.2 MMOL/L (ref 3.5–5.2)
PROT SERPL-MCNC: 5.4 G/DL (ref 6–8.5)
RBC # BLD AUTO: 4.05 10*6/MM3 (ref 4.14–5.8)
SODIUM SERPL-SCNC: 138 MMOL/L (ref 136–145)
WBC # BLD AUTO: 4.8 10*3/MM3 (ref 3.4–10.8)

## 2020-11-18 PROCEDURE — 85025 COMPLETE CBC W/AUTO DIFF WBC: CPT | Performed by: NURSE PRACTITIONER

## 2020-11-18 PROCEDURE — 80053 COMPREHEN METABOLIC PANEL: CPT | Performed by: NURSE PRACTITIONER

## 2020-11-18 PROCEDURE — 82728 ASSAY OF FERRITIN: CPT | Performed by: NURSE PRACTITIONER

## 2020-11-18 PROCEDURE — 25010000003 AMPICILLIN-SULBACTAM PER 1.5 G: Performed by: NURSE PRACTITIONER

## 2020-11-18 PROCEDURE — 94799 UNLISTED PULMONARY SVC/PX: CPT

## 2020-11-18 PROCEDURE — 82248 BILIRUBIN DIRECT: CPT | Performed by: NURSE PRACTITIONER

## 2020-11-18 PROCEDURE — 99239 HOSP IP/OBS DSCHRG MGMT >30: CPT | Performed by: INTERNAL MEDICINE

## 2020-11-18 PROCEDURE — 86140 C-REACTIVE PROTEIN: CPT | Performed by: NURSE PRACTITIONER

## 2020-11-18 PROCEDURE — 63710000001 DEXAMETHASONE PER 0.25 MG: Performed by: NURSE PRACTITIONER

## 2020-11-18 PROCEDURE — 82550 ASSAY OF CK (CPK): CPT | Performed by: NURSE PRACTITIONER

## 2020-11-18 RX ORDER — DEXAMETHASONE 2 MG/1
TABLET ORAL
Qty: 21 TABLET | Refills: 0 | Status: SHIPPED | OUTPATIENT
Start: 2020-11-18 | End: 2020-12-02

## 2020-11-18 RX ORDER — AZITHROMYCIN 250 MG/1
250 TABLET, FILM COATED ORAL DAILY
Qty: 1 TABLET | Refills: 0 | Status: SHIPPED | OUTPATIENT
Start: 2020-11-18

## 2020-11-18 RX ORDER — PANTOPRAZOLE SODIUM 40 MG/1
40 TABLET, DELAYED RELEASE ORAL DAILY
Qty: 14 TABLET | Refills: 0 | Status: SHIPPED | OUTPATIENT
Start: 2020-11-18 | End: 2020-12-02

## 2020-11-18 RX ORDER — GUAIFENESIN 600 MG/1
600 TABLET, EXTENDED RELEASE ORAL EVERY 12 HOURS SCHEDULED
Qty: 10 TABLET | Refills: 0 | Status: SHIPPED | OUTPATIENT
Start: 2020-11-18

## 2020-11-18 RX ADMIN — OXYCODONE HYDROCHLORIDE AND ACETAMINOPHEN 1000 MG: 500 TABLET ORAL at 09:40

## 2020-11-18 RX ADMIN — DEXAMETHASONE 2 MG: 4 TABLET ORAL at 05:12

## 2020-11-18 RX ADMIN — THEOPHYLLINE ANHYDROUS 300 MG: 300 CAPSULE, EXTENDED RELEASE ORAL at 09:40

## 2020-11-18 RX ADMIN — AMPICILLIN SODIUM AND SULBACTAM SODIUM 1.5 G: 1; .5 INJECTION, POWDER, FOR SOLUTION INTRAMUSCULAR; INTRAVENOUS at 09:40

## 2020-11-18 RX ADMIN — Medication 600 MG: at 09:41

## 2020-11-18 RX ADMIN — GUAIFENESIN 600 MG: 600 TABLET, EXTENDED RELEASE ORAL at 09:40

## 2020-11-18 RX ADMIN — AMPICILLIN SODIUM AND SULBACTAM SODIUM 1.5 G: 1; .5 INJECTION, POWDER, FOR SOLUTION INTRAMUSCULAR; INTRAVENOUS at 01:09

## 2020-11-18 RX ADMIN — SODIUM CHLORIDE 125 ML/HR: 9 INJECTION, SOLUTION INTRAVENOUS at 04:34

## 2020-11-18 RX ADMIN — AZITHROMYCIN 250 MG: 250 TABLET, FILM COATED ORAL at 09:40

## 2020-11-18 RX ADMIN — BUDESONIDE AND FORMOTEROL FUMARATE DIHYDRATE 2 PUFF: 160; 4.5 AEROSOL RESPIRATORY (INHALATION) at 06:58

## 2020-11-18 RX ADMIN — ZINC SULFATE 220 MG (50 MG) CAPSULE 220 MG: CAPSULE at 09:40

## 2020-11-18 RX ADMIN — PANTOPRAZOLE SODIUM 40 MG: 40 TABLET, DELAYED RELEASE ORAL at 05:12

## 2020-11-18 NOTE — DISCHARGE SUMMARY
"  Date of Admission: 11/15/2020    Date of Discharge:  11/18/2020    Length of stay:  LOS: 1 day     Admission Diagnosis:   Hypoxemia [R09.02]  Pneumonia due to COVID-19 virus [U07.1, J12.89]  Pneumonia due to COVID-19 virus [U07.1, J12.89]      Discharge Diagnosis:     Pneumonia due to Covid-19   - CT Chest reviewed and shows multifocal pneumonia   - continue steroid taper and azithromycin   - procalcitonin negative      Elevated d- dimer   - likely d/t above  - CT PE protocol negative for PE      Hyponatremia likely d/t volume depletion- resolved      Nausea, Reflux  - instructed patient to sit up right when eating or drinking and stay sitting up at least 30 minutes after eating  - continue ppi      Reactive Airway disease   - likely exacerbated by above  - continue home inhalers   - pulmonology consulted in the ED       This patient has been examined wearing appropriate Personal Protective Equipment and discussed with pulmonologist. 11/18/20          Active Hospital Problems    Diagnosis  POA   • **Pneumonia due to COVID-19 virus [U07.1, J12.89]  Yes   • Reactive airway disease [J45.909]  Yes      Resolved Hospital Problems    Diagnosis Date Resolved POA   • Hyponatremia [E87.1] 11/18/2020 Yes   • Nausea [R11.0] 11/18/2020 Yes       Hospital Course:  Patient is a 60 y.o. male who presented to the ED with vague complaints of \" feeling bad\", nausea, vomiting and diarrhea since last week. Patient reports he was tested for COVID on 11/11/2020, but just recently received his results.   Patient did not require any supplemental oxygen.  Patient was admitted with consult for Dr. Velasquez at patient's PCP request. Patient was given remdesivir and had improvement in his symptoms. He will be discharged home in stable condition. He was instructed to continue quarantine.         Procedures Performed:  none         Consults:   Consults     Date and Time Order Name Status Description    11/15/2020 1545 Pulmonology (on-call MD " unless specified) Completed     11/15/2020 1545 Hospitalist (on-call MD unless specified) Completed           Vital Signs:  Temp:  [97.5 °F (36.4 °C)-97.9 °F (36.6 °C)] 97.5 °F (36.4 °C)  Heart Rate:  [43-59] 48  Resp:  [15-16] 15  BP: (131-165)/(82-90) 156/89        Physical Exam:  Physical Exam  Vitals signs reviewed.   Constitutional:       General: He is not in acute distress.     Appearance: He is not ill-appearing.   HENT:      Head: Normocephalic and atraumatic.      Mouth/Throat:      Mouth: Mucous membranes are moist.   Pulmonary:      Effort: Pulmonary effort is normal. No respiratory distress.   Neurological:      Mental Status: He is alert.   Psychiatric:         Mood and Affect: Mood normal.         Behavior: Behavior normal.           Pertinent Test Results:   Lab Results (last 72 hours)     Procedure Component Value Units Date/Time    Comprehensive Metabolic Panel [[id number]]  (Abnormal) Collected: 11/18/20 0408    Specimen: Blood Updated: 11/18/20 0545     Glucose 125 mg/dL      BUN 17 mg/dL      Creatinine 0.66 mg/dL      Sodium 138 mmol/L      Potassium 4.2 mmol/L      Chloride 103 mmol/L      CO2 25.0 mmol/L      Calcium 8.4 mg/dL      Total Protein 5.4 g/dL      Albumin 3.00 g/dL      ALT (SGPT) 32 U/L      AST (SGOT) 20 U/L      Alkaline Phosphatase 69 U/L      Total Bilirubin 0.5 mg/dL      eGFR Non African Amer 124 mL/min/1.73      Globulin 2.4 gm/dL      A/G Ratio 1.3 g/dL      BUN/Creatinine Ratio 25.8     Anion Gap 10.0 mmol/L     Narrative:      GFR Normal >60  Chronic Kidney Disease <60  Kidney Failure <15      CK [[id number]]  (Normal) Collected: 11/18/20 0408    Specimen: Blood Updated: 11/18/20 0545     Creatine Kinase 48 U/L     C-reactive Protein [[id number]]  (Abnormal) Collected: 11/18/20 0408    Specimen: Blood Updated: 11/18/20 0545     C-Reactive Protein 4.56 mg/dL     Bilirubin, Direct [[id number]]  (Normal) Collected: 11/18/20 0408    Specimen: Blood Updated: 11/18/20 0545      Bilirubin, Direct <0.2 mg/dL     Ferritin [[id number]]  (Abnormal) Collected: 11/18/20 0408    Specimen: Blood Updated: 11/18/20 0545     Ferritin 926.90 ng/mL     Narrative:      Results may be falsely decreased if patient taking Biotin.      CBC & Differential [[id number]]  (Abnormal) Collected: 11/18/20 0408    Specimen: Blood Updated: 11/18/20 0517    Narrative:      The following orders were created for panel order CBC & Differential.  Procedure                               Abnormality         Status                     ---------                               -----------         ------                     CBC Auto Differential[[id number]]        Abnormal            Final result                 Please view results for these tests on the individual orders.    CBC Auto Differential [[id number]]  (Abnormal) Collected: 11/18/20 0408    Specimen: Blood Updated: 11/18/20 0517     WBC 4.80 10*3/mm3      RBC 4.05 10*6/mm3      Hemoglobin 13.4 g/dL      Hematocrit 38.8 %      MCV 95.7 fL      MCH 32.9 pg      MCHC 34.4 g/dL      RDW 13.3 %      RDW-SD 43.8 fl      MPV 7.9 fL      Platelets 425 10*3/mm3      Neutrophil % 77.5 %      Lymphocyte % 14.7 %      Monocyte % 7.6 %      Eosinophil % 0.0 %      Basophil % 0.2 %      Neutrophils, Absolute 3.70 10*3/mm3      Lymphocytes, Absolute 0.70 10*3/mm3      Monocytes, Absolute 0.40 10*3/mm3      Eosinophils, Absolute 0.00 10*3/mm3      Basophils, Absolute 0.00 10*3/mm3      nRBC 0.0 /100 WBC     Ferritin [[id number]]  (Abnormal) Collected: 11/17/20 0442    Specimen: Blood Updated: 11/17/20 0544     Ferritin 876.30 ng/mL     Narrative:      Results may be falsely decreased if patient taking Biotin.      Comprehensive Metabolic Panel [[id number]]  (Abnormal) Collected: 11/17/20 0442    Specimen: Blood Updated: 11/17/20 0542     Glucose 117 mg/dL      BUN 20 mg/dL      Creatinine 0.63 mg/dL      Sodium 137 mmol/L      Potassium 4.4 mmol/L      Chloride 101 mmol/L      CO2 26.0  mmol/L      Calcium 8.4 mg/dL      Total Protein 5.4 g/dL      Albumin 2.90 g/dL      ALT (SGPT) 30 U/L      AST (SGOT) 18 U/L      Alkaline Phosphatase 74 U/L      Total Bilirubin 0.5 mg/dL      eGFR Non African Amer 131 mL/min/1.73      Globulin 2.5 gm/dL      A/G Ratio 1.2 g/dL      BUN/Creatinine Ratio 31.7     Anion Gap 10.0 mmol/L     Narrative:      GFR Normal >60  Chronic Kidney Disease <60  Kidney Failure <15      CK [[id number]]  (Normal) Collected: 11/17/20 0442    Specimen: Blood Updated: 11/17/20 0542     Creatine Kinase 55 U/L     C-reactive Protein [[id number]]  (Abnormal) Collected: 11/17/20 0442    Specimen: Blood Updated: 11/17/20 0542     C-Reactive Protein 8.74 mg/dL     Lactate Dehydrogenase [[id number]]  (Normal) Collected: 11/17/20 0442    Specimen: Blood Updated: 11/17/20 0542      U/L     Bilirubin, Direct [[id number]]  (Normal) Collected: 11/17/20 0442    Specimen: Blood Updated: 11/17/20 0542     Bilirubin, Direct <0.2 mg/dL     Fibrinogen [[id number]]  (Abnormal) Collected: 11/17/20 0442    Specimen: Blood Updated: 11/17/20 0524     Fibrinogen 573 mg/dL     D-dimer, Quantitative [[id number]]  (Abnormal) Collected: 11/17/20 0442    Specimen: Blood Updated: 11/17/20 0524     D-Dimer, Quantitative 0.62 mg/L (FEU)     Narrative:      Reference Range  --------------------------------------------------------------------     < 0.50   Negative Predictive Value  0.50-0.59   Indeterminate    >= 0.60   Probable VTE             A very low percentage of patients with DVT may yield D-Dimer results   below the cut-off of 0.50 mg/L FEU.  This is known to be more   prevalent in patients with distal DVT.             Results of this test should always be interpreted in conjunction with   the patient's medical history, clinical presentation and other   findings.  Clinical diagnosis should not be based on the result of   IN[place]OVANCE D-Dimer alone.    CBC & Differential [[id number]]  (Abnormal) Collected:  11/17/20 0442    Specimen: Blood Updated: 11/17/20 0520    Narrative:      The following orders were created for panel order CBC & Differential.  Procedure                               Abnormality         Status                     ---------                               -----------         ------                     CBC Auto Differential[[id number]]        Abnormal            Final result                 Please view results for these tests on the individual orders.    CBC Auto Differential [[id number]]  (Abnormal) Collected: 11/17/20 0442    Specimen: Blood Updated: 11/17/20 0520     WBC 4.90 10*3/mm3      RBC 4.14 10*6/mm3      Hemoglobin 13.6 g/dL      Hematocrit 39.6 %      MCV 95.5 fL      MCH 32.9 pg      MCHC 34.5 g/dL      RDW 13.6 %      RDW-SD 45.5 fl      MPV 7.5 fL      Platelets 364 10*3/mm3      Neutrophil % 79.3 %      Lymphocyte % 12.7 %      Monocyte % 7.9 %      Eosinophil % 0.0 %      Basophil % 0.1 %      Neutrophils, Absolute 3.90 10*3/mm3      Lymphocytes, Absolute 0.60 10*3/mm3      Monocytes, Absolute 0.40 10*3/mm3      Eosinophils, Absolute 0.00 10*3/mm3      Basophils, Absolute 0.00 10*3/mm3      nRBC 0.0 /100 WBC     D-dimer, Quantitative [[id number]]  (Abnormal) Collected: 11/16/20 1411    Specimen: Blood Updated: 11/16/20 1456     D-Dimer, Quantitative 0.81 mg/L (FEU)     Narrative:      Reference Range  --------------------------------------------------------------------     < 0.50   Negative Predictive Value  0.50-0.59   Indeterminate    >= 0.60   Probable VTE             A very low percentage of patients with DVT may yield D-Dimer results   below the cut-off of 0.50 mg/L FEU.  This is known to be more   prevalent in patients with distal DVT.             Results of this test should always be interpreted in conjunction with   the patient's medical history, clinical presentation and other   findings.  Clinical diagnosis should not be based on the result of   IN[place]OVANCE D-Dimer alone.     "Hepatic Function Panel [[id number]]  (Abnormal) Collected: 11/16/20 1018    Specimen: Blood Updated: 11/16/20 1133     Total Protein 5.7 g/dL      Albumin 3.00 g/dL      ALT (SGPT) 26 U/L      AST (SGOT) 20 U/L      Alkaline Phosphatase 89 U/L      Total Bilirubin 0.5 mg/dL      Bilirubin, Direct 0.2 mg/dL      Bilirubin, Indirect 0.3 mg/dL     Creatinine, Serum [[id number]]  (Normal) Collected: 11/16/20 0624    Specimen: Blood Updated: 11/16/20 0940     Creatinine 0.80 mg/dL      eGFR Non African Amer 99 mL/min/1.73     Narrative:      GFR Normal >60  Chronic Kidney Disease <60  Kidney Failure <15      Procalcitonin [[id number]]  (Normal) Collected: 11/16/20 0624    Specimen: Blood Updated: 11/16/20 0729     Procalcitonin 0.05 ng/mL     Narrative:      As a Marker for Sepsis (Non-Neonates):   1. <0.5 ng/mL represents a low risk of severe sepsis and/or septic shock.  1. >2 ng/mL represents a high risk of severe sepsis and/or septic shock.    As a Marker for Lower Respiratory Tract Infections that require antibiotic therapy:  PCT on Admission     Antibiotic Therapy             6-12 Hrs later  > 0.5                Strongly Recommended            >0.25 - <0.5         Recommended  0.1 - 0.25           Discouraged                   Remeasure/reassess PCT  <0.1                 Strongly Discouraged          Remeasure/reassess PCT      As 28 day mortality risk marker: \"Change in Procalcitonin Result\" (> 80 % or <=80 %) if Day 0 (or Day 1) and Day 4 values are available. Refer to http://www.[employer]-pct-calculator.com/   Change in PCT <=80 %   A decrease of PCT levels below or equal to 80 % defines a positive change in PCT test result representing a higher risk for 28-day all-cause mortality of patients diagnosed with severe sepsis or septic shock.  Change in PCT > 80 %   A decrease of PCT levels of more than 80 % defines a negative change in PCT result representing a lower risk for 28-day all-cause mortality of patients " diagnosed with severe sepsis or septic shock.                Results may be falsely decreased if patient taking Biotin.     Hepatic Function Panel [[id number]]  (Abnormal) Collected: 11/16/20 0624    Specimen: Blood Updated: 11/16/20 0728     Total Protein 6.1 g/dL      Albumin 3.10 g/dL      ALT (SGPT) 30 U/L      AST (SGOT) 26 U/L      Comment: Specimen hemolyzed.  Results may be affected.        Alkaline Phosphatase 85 U/L      Total Bilirubin 0.5 mg/dL      Bilirubin, Direct <0.2 mg/dL      Comment: Specimen hemolyzed. Results may be affected.        Bilirubin, Indirect --     Comment: Unable to calculate       Extra Tubes [[id number]] Collected: 11/15/20 1304    Specimen: Blood from Arm, Left Updated: 11/15/20 1415    Narrative:      The following orders were created for panel order Extra Tubes.  Procedure                               Abnormality         Status                     ---------                               -----------         ------                     Light Blue Top[[id number]]                                   Final result               Gold Top - SST[[id number]]                                   Final result                 Please view results for these tests on the individual orders.    Light Blue Top [[id number]] Collected: 11/15/20 1304    Specimen: Blood from Arm, Left Updated: 11/15/20 1415     Extra Tube hold for add-on     Comment: Auto resulted       Gold Top - SST [[id number]] Collected: 11/15/20 1304    Specimen: Blood from Arm, Left Updated: 11/15/20 1415     Extra Tube Hold for add-ons.     Comment: Auto resulted.       Comprehensive Metabolic Panel [[id number]]  (Abnormal) Collected: 11/15/20 1304    Specimen: Blood from Arm, Left Updated: 11/15/20 1349     Glucose 126 mg/dL      BUN 22 mg/dL      Creatinine 0.81 mg/dL      Sodium 132 mmol/L      Potassium 4.2 mmol/L      Chloride 95 mmol/L      CO2 25.0 mmol/L      Calcium 9.2 mg/dL      Total Protein 6.8 g/dL      Albumin 3.70 g/dL       ALT (SGPT) 26 U/L      AST (SGOT) 18 U/L      Alkaline Phosphatase 97 U/L      Total Bilirubin 0.6 mg/dL      eGFR Non African Amer 98 mL/min/1.73      Globulin 3.1 gm/dL      A/G Ratio 1.2 g/dL      BUN/Creatinine Ratio 27.2     Anion Gap 12.0 mmol/L     Narrative:      GFR Normal >60  Chronic Kidney Disease <60  Kidney Failure <15      C-reactive Protein [[id number]]  (Abnormal) Collected: 11/15/20 1304    Specimen: Blood from Arm, Left Updated: 11/15/20 1349     C-Reactive Protein 25.04 mg/dL     CBC & Differential [[id number]]  (Abnormal) Collected: 11/15/20 1304    Specimen: Blood from Arm, Left Updated: 11/15/20 1319    Narrative:      The following orders were created for panel order CBC & Differential.  Procedure                               Abnormality         Status                     ---------                               -----------         ------                     CBC Auto Differential[[id number]]        Abnormal            Final result                 Please view results for these tests on the individual orders.    CBC Auto Differential [[id number]]  (Abnormal) Collected: 11/15/20 1304    Specimen: Blood from Arm, Left Updated: 11/15/20 1319     WBC 13.50 10*3/mm3      RBC 4.48 10*6/mm3      Hemoglobin 14.7 g/dL      Hematocrit 42.7 %      MCV 95.3 fL      MCH 32.9 pg      MCHC 34.5 g/dL      RDW 13.7 %      RDW-SD 45.1 fl      MPV 7.7 fL      Platelets 343 10*3/mm3      Neutrophil % 95.8 %      Lymphocyte % 2.0 %      Monocyte % 2.0 %      Eosinophil % 0.0 %      Basophil % 0.2 %      Neutrophils, Absolute 13.00 10*3/mm3      Lymphocytes, Absolute 0.30 10*3/mm3      Monocytes, Absolute 0.30 10*3/mm3      Eosinophils, Absolute 0.00 10*3/mm3      Basophils, Absolute 0.00 10*3/mm3      nRBC 0.0 /100 WBC             Imaging Results (All)     Procedure Component Value Units Date/Time    CT Chest Pulmonary Embolism [[id number]] Collected: 11/15/20 1508     Updated: 11/15/20 1527    Narrative:         DATE  OF EXAM:  11/15/2020 2:42 PM     PROCEDURE:  CT CHEST PULMONARY EMBOLISM-     INDICATIONS:   PE suspected, high prob     COMPARISON:   No Comparisons Available     TECHNIQUE:  Routine transaxial slices were obtained through chest after  administration of intravenous 100 ml of Isovue 370. Reconstructed  coronal and sagittal images were also obtained. Automated exposure  control and iterative reconstruction methods were used.      FINDINGS:  There is excellent pulmonary arterial opacification and there are no  filling defects to suggest pulmonary embolic disease.     There are diffuse, bilateral upper and lower lobe areas of groundglass  attenuation consistent with pneumonia. The pattern would be consistent  with Covid 19 pneumonia. There are right hilar, right paratracheal and  subcarinal prominent lymph nodes likely reactive given the presence of  pneumonia.     Limited evaluation of the upper solid abdominal structures demonstrates  right renal cystic change.        Impression:      No evidence of pulmonary embolism. Multifocal pneumonia in a  distribution consistent with Covid 19 pneumonia     Electronically Signed By-Leif Phoenix MD On:11/15/2020 3:10 PM  This report was finalized on [id number] by  Leif Phoenix MD.                Discharge Disposition:  Home or Self Care    Discharge Medications:     Discharge Medications      New Medications      Instructions Start Date   azithromycin 250 MG tablet  Commonly known as: ZITHROMAX   250 mg, Oral, Daily      dexamethasone 2 MG tablet  Commonly known as: DECADRON   Take 1 tablet by mouth 2 (Two) Times a Day With Meals for 7 days, THEN 1 tablet Daily With Breakfast for 7 days.   Start Date: November 18, 2020     guaiFENesin 600 MG 12 hr tablet  Commonly known as: MUCINEX   600 mg, Oral, Every 12 Hours Scheduled      pantoprazole 40 MG EC tablet  Commonly known as: PROTONIX   40 mg, Oral, Daily         Continue These Medications      Instructions Start Date    albuterol sulfate  (90 Base) MCG/ACT inhaler  Commonly known as: PROVENTIL HFA;VENTOLIN HFA;PROAIR HFA   2 puffs, Inhalation, Every 4 Hours PRN      benzonatate 200 MG capsule  Commonly known as: TESSALON   200 mg, Oral, 3 Times Daily PRN         Stop These Medications    doxycycline 100 MG capsule  Commonly known as: VIBRAMYCIN     methylPREDNISolone 4 MG dose pack  Commonly known as: MEDROL            Discharge Diet:   Diet Instructions     Diet: Regular      Discharge Diet: Regular          Activity at Discharge:   Activity Instructions     Activity as Tolerated            Follow-up Appointments:  No future appointments.      Test Results Pending at Discharge:  none    Condition on Discharge:    stable      Risk for Readmission (LACE): Score: 8 (11/18/2020  6:00 AM)          Jaswant Elizondo DO  11/18/20  14:18 EST    Time: Discharge 32 min with face-to-face history/exam, writing all prescriptions, and documenting discharge data including care coordination

## 2020-11-18 NOTE — PROGRESS NOTES
Continued Stay Note  HORACIO Kruse     Patient Name: Miah Carroll  MRN: [id number]  Today's Date: 11/18/2020    Admit Date: 11/15/2020    Discharge Plan     Row Name 11/18/20 1258       Plan    Plan Comments  Discharge orders noted    Final Discharge Disposition Code  01 - home or self-care    Final Note  Home              April Cramer RN

## 2020-11-18 NOTE — PROGRESS NOTES
Daily Progress Note    Pneumonia due to COVID-19 virus    Reactive airway disease    Hyponatremia    Nausea    Assessment:     Hypoxia due to Covid 19 pneumonia improved  No PE on CAT scan  Diffuse bilateral alveolar infiltrate secondary to COVID-19 infection  History of reactive airway disease        Recommendations:     Remdesivir added  Antibiotics azithromycin and unasyn  Steroids decadron 2 mg   Antiinflammatory agents vit c, zinc, theophylline, and mucomyst  Anticoagulation enoxaparin per Covid protocol        Subjective     Patient feels better today activity increased with appetite and continues to  push fluids he is wanting to go home today    Objective                   LOS: 1 day       Subjective     Patient feels better today; can walk to the bathroom without resting and trying to eat and push fluids    Objective     Vital signs for last 24 hours:  Vitals:    11/17/20 2300 11/18/20 0351 11/18/20 0658 11/18/20 0815   BP: 165/82 149/90  156/89   BP Location: Right arm Right arm  Right arm   Patient Position: Lying Lying  Lying   Pulse: 53 (!) 43 51 (!) 48   Resp: 16 16 16 15   Temp: 97.9 °F (36.6 °C) 97.8 °F (36.6 °C)  97.5 °F (36.4 °C)   TempSrc: Oral Oral  Oral   SpO2: 93% 98% 97% 94%   Weight:  83.1 kg (183 lb 3.2 oz)     Height:           Intake/Output last 3 shifts:  I/O last 3 completed shifts:  In: 1920 [P.O.:720; I.V.:1000; IV Piggyback:200]  Out: -   Intake/Output this shift:  I/O this shift:  In: -   Out: 475 [Urine:475]      Radiology  Imaging Results (Last 24 Hours)     ** No results found for the last 24 hours. **          Labs:  Results from last 7 days   Lab Units 11/18/20  0408   WBC 10*3/mm3 4.80   HEMOGLOBIN g/dL 13.4   HEMATOCRIT % 38.8   PLATELETS 10*3/mm3 425     Results from last 7 days   Lab Units 11/18/20  0408   SODIUM mmol/L 138   POTASSIUM mmol/L 4.2   CHLORIDE mmol/L 103   CO2 mmol/L 25.0   BUN mg/dL 17   CREATININE mg/dL 0.66*   CALCIUM mg/dL 8.4*   BILIRUBIN mg/dL 0.5   ALK  PHOS U/L 69   ALT (SGPT) U/L 32   AST (SGOT) U/L 20   GLUCOSE mg/dL 125*         Results from last 7 days   Lab Units 11/18/20  0408 11/17/20  0442 11/16/20  1018   ALBUMIN g/dL 3.00* 2.90* 3.00*     Results from last 7 days   Lab Units 11/18/20  0408 11/17/20  0442   CK TOTAL U/L 48 55                           Meds:   SCHEDULE  Acetylcysteine, 600 mg, Oral, BID  ampicillin-sulbactam, 1.5 g, Intravenous, Q6H  azithromycin, 250 mg, Oral, Q24H  budesonide-formoterol, 2 puff, Inhalation, BID - RT  dexamethasone, 2 mg, Oral, Q8H  enoxaparin, 40 mg, Subcutaneous, Q24H  guaiFENesin, 600 mg, Oral, Q12H  pantoprazole, 40 mg, Oral, Q AM  remdesivir, 100 mg, Intravenous, Q24H  theophylline, 300 mg, Oral, Q24H  vitamin C, 1,000 mg, Oral, Daily  zinc sulfate, 220 mg, Oral, Daily      Infusions  Pharmacy Consult - Remdesivir,   sodium chloride, 125 mL/hr, Last Rate: 125 mL/hr (11/18/20 0434)      PRNs  albuterol sulfate HFA  •  benzonatate  •  ondansetron  •  Pharmacy Consult - Remdesivir    Physical Exam:  Physical Exam  Vitals signs reviewed.   Pulmonary:      Effort: Pulmonary effort is normal.      Breath sounds: Normal breath sounds.   Skin:     General: Skin is warm and dry.   Neurological:      Mental Status: He is alert and oriented to person, place, and time.         ROS  Review of Systems   Constitutional: Positive for activity change and fatigue.   Respiratory: Positive for cough.

## 2020-11-19 ENCOUNTER — READMISSION MANAGEMENT (OUTPATIENT)
Dept: CALL CENTER | Facility: HOSPITAL | Age: 60
End: 2020-11-19

## 2020-11-19 NOTE — OUTREACH NOTE
Prep Survey      Responses   [facility] facility patient discharged from?  [facility]   Is LACE score < 7 ?  No   Eligibility  Readm Mgmt   Discharge diagnosis  PNA due to Covid 19   Does the patient have one of the following disease processes/diagnoses(primary or secondary)?  COVID-19   Does the patient have Home health ordered?  No   Is there a DME ordered?  No   Prep survey completed?  Yes          Anyi Johnson RN

## 2020-11-19 NOTE — OUTREACH NOTE
COVID-19 Week 1 Survey      Responses   [facility] patient discharged from?  [facility]   Does the patient have one of the following disease processes/diagnoses(primary or secondary)?  COVID-19   COVID-19 underlying condition?  None   Call Number  Call 1   Week 1 Call successful?  No   Discharge diagnosis  PNA due to Covid 19          Antonette Jesus LPN

## 2020-11-20 ENCOUNTER — READMISSION MANAGEMENT (OUTPATIENT)
Dept: CALL CENTER | Facility: HOSPITAL | Age: 60
End: 2020-11-20

## 2020-11-20 LAB — BACTERIA SPEC AEROBE CULT: NORMAL

## 2020-11-20 NOTE — PAYOR COMM NOTE
"Discharge notification for case# [id number]    -------------  AUTHORIZATION PENDING:   PLEASE FAX OR CALL DETERMINATION TO CONTACT BELOW:       THANK YOU,    MAMI Braswell, RN  Utilization Review  [facility]  Phone: [phone]  Fax: [phone]      NPI: [id number]  Tax ID: [id number]      Miah Carroll (60 y.o. Male)     Date of Birth Social Security Number Address Home Phone MRN    1960  [id number] [place] [address] [phone] [id number]    Latter day Marital Status          None Single       Admission Date Admission Type Admitting Provider Attending Provider Department, Room/Bed    11/15/20 Emergency Jaswant Elizondo DO  [facility] 3A MEDICAL INPATIENT, 307/1    Discharge Date Discharge Disposition Discharge Destination        11/18/2020 Home or Self Care              Attending Provider: (none)   Allergies: No Known Allergies    Isolation: Enh Drop/Con   Infection: COVID (confirmed) (11/13/20)   Code Status: Prior    Ht: 177.8 cm (70\")   Wt: 83.1 kg (183 lb 3.2 oz)    Admission Cmt: None   Principal Problem: Pneumonia due to COVID-19 virus [U07.1,J12.89]                 Active Insurance as of 11/15/2020     Primary Coverage     Payor Plan Insurance Group Employer/Plan Group    [facility]      Payor Plan Address Payor Plan Phone Number Payor Plan Fax Number Effective Dates    [address] [phone]  11/27/2019 - None Entered    [place] [address]       Subscriber Name Subscriber Birth Date Member ID       MIAH CARROLL 1960 [id number]                 Emergency Contacts      (Rel.) Home Phone Work Phone Mobile Phone    NELSON CARROLL (Father) [phone] -- --               Discharge Summary      Jaswant Elizondo DO at 11/18/20 1300            Date of Admission: 11/15/2020    Date of Discharge:  11/18/2020      Admission Diagnosis:   Hypoxemia [R09.02]  Pneumonia due to COVID-19 virus [U07.1, " "J12.89]  Pneumonia due to COVID-19 virus [U07.1, J12.89]      Discharge Diagnosis:     Pneumonia due to Covid-19   - CT Chest reviewed and shows multifocal pneumonia   - continue steroid taper and azithromycin   - procalcitonin negative      Elevated d- dimer   - likely d/t above  - CT PE protocol negative for PE      Hyponatremia likely d/t volume depletion- resolved      Nausea, Reflux  - instructed patient to sit up right when eating or drinking and stay sitting up at least 30 minutes after eating  - continue ppi      Reactive Airway disease   - likely exacerbated by above  - continue home inhalers   - pulmonology consulted in the ED       This patient has been examined wearing appropriate Personal Protective Equipment and discussed with pulmonologist. 11/18/20          Active Hospital Problems    Diagnosis  POA   • **Pneumonia due to COVID-19 virus [U07.1, J12.89]  Yes   • Reactive airway disease [J45.909]  Yes      Resolved Hospital Problems    Diagnosis Date Resolved POA   • Hyponatremia [E87.1] 11/18/2020 Yes   • Nausea [R11.0] 11/18/2020 Yes       Hospital Course:  Patient is a 60 y.o. male who presented to the ED with vague complaints of \" feeling bad\", nausea, vomiting and diarrhea since last week. Patient reports he was tested for COVID on 11/11/2020, but just recently received his results.   Patient did not require any supplemental oxygen.  Patient was admitted with consult for Dr. Velasquez at patient's PCP request. Patient was given remdesivir and had improvement in his symptoms. He will be discharged home in stable condition. He was instructed to continue quarantine.         Procedures Performed:  none         Consults:   Consults     Date and Time Order Name Status Description    11/15/2020 1545 Pulmonology (on-call MD unless specified) Completed     11/15/2020 1545 Hospitalist (on-call MD unless specified) Completed           Vital Signs:  Temp:  [97.5 °F (36.4 °C)-97.9 °F (36.6 °C)] 97.5 °F (36.4 " °C)  Heart Rate:  [43-59] 48  Resp:  [15-16] 15  BP: (131-165)/(82-90) 156/89        Physical Exam:  Physical Exam  Vitals signs reviewed.   Constitutional:       General: He is not in acute distress.     Appearance: He is not ill-appearing.   HENT:      Head: Normocephalic and atraumatic.      Mouth/Throat:      Mouth: Mucous membranes are moist.   Pulmonary:      Effort: Pulmonary effort is normal. No respiratory distress.   Neurological:      Mental Status: He is alert.   Psychiatric:         Mood and Affect: Mood normal.         Behavior: Behavior normal.           Pertinent Test Results:   Lab Results (last 72 hours)     Procedure Component Value Units Date/Time    Comprehensive Metabolic Panel [[id number]]  (Abnormal) Collected: 11/18/20 0408    Specimen: Blood Updated: 11/18/20 0545     Glucose 125 mg/dL      BUN 17 mg/dL      Creatinine 0.66 mg/dL      Sodium 138 mmol/L      Potassium 4.2 mmol/L      Chloride 103 mmol/L      CO2 25.0 mmol/L      Calcium 8.4 mg/dL      Total Protein 5.4 g/dL      Albumin 3.00 g/dL      ALT (SGPT) 32 U/L      AST (SGOT) 20 U/L      Alkaline Phosphatase 69 U/L      Total Bilirubin 0.5 mg/dL      eGFR Non African Amer 124 mL/min/1.73      Globulin 2.4 gm/dL      A/G Ratio 1.3 g/dL      BUN/Creatinine Ratio 25.8     Anion Gap 10.0 mmol/L     Narrative:      GFR Normal >60  Chronic Kidney Disease <60  Kidney Failure <15      CK [[id number]]  (Normal) Collected: 11/18/20 0408    Specimen: Blood Updated: 11/18/20 0545     Creatine Kinase 48 U/L     C-reactive Protein [[id number]]  (Abnormal) Collected: 11/18/20 0408    Specimen: Blood Updated: 11/18/20 0545     C-Reactive Protein 4.56 mg/dL     Bilirubin, Direct [[id number]]  (Normal) Collected: 11/18/20 0408    Specimen: Blood Updated: 11/18/20 0545     Bilirubin, Direct <0.2 mg/dL     Ferritin [[id number]]  (Abnormal) Collected: 11/18/20 0408    Specimen: Blood Updated: 11/18/20 0545     Ferritin 926.90 ng/mL     Narrative:       Results may be falsely decreased if patient taking Biotin.      CBC & Differential [[id number]]  (Abnormal) Collected: 11/18/20 0408    Specimen: Blood Updated: 11/18/20 0517    Narrative:      The following orders were created for panel order CBC & Differential.  Procedure                               Abnormality         Status                     ---------                               -----------         ------                     CBC Auto Differential[[id number]]        Abnormal            Final result                 Please view results for these tests on the individual orders.    CBC Auto Differential [[id number]]  (Abnormal) Collected: 11/18/20 0408    Specimen: Blood Updated: 11/18/20 0517     WBC 4.80 10*3/mm3      RBC 4.05 10*6/mm3      Hemoglobin 13.4 g/dL      Hematocrit 38.8 %      MCV 95.7 fL      MCH 32.9 pg      MCHC 34.4 g/dL      RDW 13.3 %      RDW-SD 43.8 fl      MPV 7.9 fL      Platelets 425 10*3/mm3      Neutrophil % 77.5 %      Lymphocyte % 14.7 %      Monocyte % 7.6 %      Eosinophil % 0.0 %      Basophil % 0.2 %      Neutrophils, Absolute 3.70 10*3/mm3      Lymphocytes, Absolute 0.70 10*3/mm3      Monocytes, Absolute 0.40 10*3/mm3      Eosinophils, Absolute 0.00 10*3/mm3      Basophils, Absolute 0.00 10*3/mm3      nRBC 0.0 /100 WBC     Ferritin [[id number]]  (Abnormal) Collected: 11/17/20 0442    Specimen: Blood Updated: 11/17/20 0544     Ferritin 876.30 ng/mL     Narrative:      Results may be falsely decreased if patient taking Biotin.      Comprehensive Metabolic Panel [[id number]]  (Abnormal) Collected: 11/17/20 0442    Specimen: Blood Updated: 11/17/20 0542     Glucose 117 mg/dL      BUN 20 mg/dL      Creatinine 0.63 mg/dL      Sodium 137 mmol/L      Potassium 4.4 mmol/L      Chloride 101 mmol/L      CO2 26.0 mmol/L      Calcium 8.4 mg/dL      Total Protein 5.4 g/dL      Albumin 2.90 g/dL      ALT (SGPT) 30 U/L      AST (SGOT) 18 U/L      Alkaline Phosphatase 74 U/L      Total Bilirubin 0.5  mg/dL      eGFR Non African Amer 131 mL/min/1.73      Globulin 2.5 gm/dL      A/G Ratio 1.2 g/dL      BUN/Creatinine Ratio 31.7     Anion Gap 10.0 mmol/L     Narrative:      GFR Normal >60  Chronic Kidney Disease <60  Kidney Failure <15      CK [[id number]]  (Normal) Collected: 11/17/20 0442    Specimen: Blood Updated: 11/17/20 0542     Creatine Kinase 55 U/L     C-reactive Protein [[id number]]  (Abnormal) Collected: 11/17/20 0442    Specimen: Blood Updated: 11/17/20 0542     C-Reactive Protein 8.74 mg/dL     Lactate Dehydrogenase [[id number]]  (Normal) Collected: 11/17/20 0442    Specimen: Blood Updated: 11/17/20 0542      U/L     Bilirubin, Direct [[id number]]  (Normal) Collected: 11/17/20 0442    Specimen: Blood Updated: 11/17/20 0542     Bilirubin, Direct <0.2 mg/dL     Fibrinogen [[id number]]  (Abnormal) Collected: 11/17/20 0442    Specimen: Blood Updated: 11/17/20 0524     Fibrinogen 573 mg/dL     D-dimer, Quantitative [[id number]]  (Abnormal) Collected: 11/17/20 0442    Specimen: Blood Updated: 11/17/20 0524     D-Dimer, Quantitative 0.62 mg/L (FEU)     Narrative:      Reference Range  --------------------------------------------------------------------     < 0.50   Negative Predictive Value  0.50-0.59   Indeterminate    >= 0.60   Probable VTE             A very low percentage of patients with DVT may yield D-Dimer results   below the cut-off of 0.50 mg/L FEU.  This is known to be more   prevalent in patients with distal DVT.             Results of this test should always be interpreted in conjunction with   the patient's medical history, clinical presentation and other   findings.  Clinical diagnosis should not be based on the result of   INNOVANCE D-Dimer alone.    CBC & Differential [[id number]]  (Abnormal) Collected: 11/17/20 0442    Specimen: Blood Updated: 11/17/20 0520    Narrative:      The following orders were created for panel order CBC & Differential.  Procedure                                Abnormality         Status                     ---------                               -----------         ------                     CBC Auto Differential[[id number]]        Abnormal            Final result                 Please view results for these tests on the individual orders.    CBC Auto Differential [[id number]]  (Abnormal) Collected: 11/17/20 0442    Specimen: Blood Updated: 11/17/20 0520     WBC 4.90 10*3/mm3      RBC 4.14 10*6/mm3      Hemoglobin 13.6 g/dL      Hematocrit 39.6 %      MCV 95.5 fL      MCH 32.9 pg      MCHC 34.5 g/dL      RDW 13.6 %      RDW-SD 45.5 fl      MPV 7.5 fL      Platelets 364 10*3/mm3      Neutrophil % 79.3 %      Lymphocyte % 12.7 %      Monocyte % 7.9 %      Eosinophil % 0.0 %      Basophil % 0.1 %      Neutrophils, Absolute 3.90 10*3/mm3      Lymphocytes, Absolute 0.60 10*3/mm3      Monocytes, Absolute 0.40 10*3/mm3      Eosinophils, Absolute 0.00 10*3/mm3      Basophils, Absolute 0.00 10*3/mm3      nRBC 0.0 /100 WBC     D-dimer, Quantitative [[id number]]  (Abnormal) Collected: 11/16/20 1411    Specimen: Blood Updated: 11/16/20 1456     D-Dimer, Quantitative 0.81 mg/L (FEU)     Narrative:      Reference Range  --------------------------------------------------------------------     < 0.50   Negative Predictive Value  0.50-0.59   Indeterminate    >= 0.60   Probable VTE             A very low percentage of patients with DVT may yield D-Dimer results   below the cut-off of 0.50 mg/L FEU.  This is known to be more   prevalent in patients with distal DVT.             Results of this test should always be interpreted in conjunction with   the patient's medical history, clinical presentation and other   findings.  Clinical diagnosis should not be based on the result of   INNOVANCE D-Dimer alone.    Hepatic Function Panel [[id number]]  (Abnormal) Collected: 11/16/20 1018    Specimen: Blood Updated: 11/16/20 1133     Total Protein 5.7 g/dL      Albumin 3.00 g/dL      ALT (SGPT) 26 U/L   "    AST (SGOT) 20 U/L      Alkaline Phosphatase 89 U/L      Total Bilirubin 0.5 mg/dL      Bilirubin, Direct 0.2 mg/dL      Bilirubin, Indirect 0.3 mg/dL     Creatinine, Serum [[id number]]  (Normal) Collected: 11/16/20 0624    Specimen: Blood Updated: 11/16/20 0940     Creatinine 0.80 mg/dL      eGFR Non African Amer 99 mL/min/1.73     Narrative:      GFR Normal >60  Chronic Kidney Disease <60  Kidney Failure <15      Procalcitonin [[id number]]  (Normal) Collected: 11/16/20 0624    Specimen: Blood Updated: 11/16/20 0729     Procalcitonin 0.05 ng/mL     Narrative:      As a Marker for Sepsis (Non-Neonates):   1. <0.5 ng/mL represents a low risk of severe sepsis and/or septic shock.  1. >2 ng/mL represents a high risk of severe sepsis and/or septic shock.    As a Marker for Lower Respiratory Tract Infections that require antibiotic therapy:  PCT on Admission     Antibiotic Therapy             6-12 Hrs later  > 0.5                Strongly Recommended            >0.25 - <0.5         Recommended  0.1 - 0.25           Discouraged                   Remeasure/reassess PCT  <0.1                 Strongly Discouraged          Remeasure/reassess PCT      As 28 day mortality risk marker: \"Change in Procalcitonin Result\" (> 80 % or <=80 %) if Day 0 (or Day 1) and Day 4 values are available. Refer to http://www.[employer]-pct-calculator.com/   Change in PCT <=80 %   A decrease of PCT levels below or equal to 80 % defines a positive change in PCT test result representing a higher risk for 28-day all-cause mortality of patients diagnosed with severe sepsis or septic shock.  Change in PCT > 80 %   A decrease of PCT levels of more than 80 % defines a negative change in PCT result representing a lower risk for 28-day all-cause mortality of patients diagnosed with severe sepsis or septic shock.                Results may be falsely decreased if patient taking Biotin.     Hepatic Function Panel [[id number]]  (Abnormal) Collected: 11/16/20 0624 "    Specimen: Blood Updated: 11/16/20 0728     Total Protein 6.1 g/dL      Albumin 3.10 g/dL      ALT (SGPT) 30 U/L      AST (SGOT) 26 U/L      Comment: Specimen hemolyzed.  Results may be affected.        Alkaline Phosphatase 85 U/L      Total Bilirubin 0.5 mg/dL      Bilirubin, Direct <0.2 mg/dL      Comment: Specimen hemolyzed. Results may be affected.        Bilirubin, Indirect --     Comment: Unable to calculate       Extra Tubes [[id number]] Collected: 11/15/20 1304    Specimen: Blood from Arm, Left Updated: 11/15/20 1415    Narrative:      The following orders were created for panel order Extra Tubes.  Procedure                               Abnormality         Status                     ---------                               -----------         ------                     Light Blue Top[[id number]]                                   Final result               Gold Top - SST[[id number]]                                   Final result                 Please view results for these tests on the individual orders.    Light Blue Top [[id number]] Collected: 11/15/20 1304    Specimen: Blood from Arm, Left Updated: 11/15/20 1415     Extra Tube hold for add-on     Comment: Auto resulted       Gold Top - SST [[id number]] Collected: 11/15/20 1304    Specimen: Blood from Arm, Left Updated: 11/15/20 1415     Extra Tube Hold for add-ons.     Comment: Auto resulted.       Comprehensive Metabolic Panel [[id number]]  (Abnormal) Collected: 11/15/20 1304    Specimen: Blood from Arm, Left Updated: 11/15/20 1349     Glucose 126 mg/dL      BUN 22 mg/dL      Creatinine 0.81 mg/dL      Sodium 132 mmol/L      Potassium 4.2 mmol/L      Chloride 95 mmol/L      CO2 25.0 mmol/L      Calcium 9.2 mg/dL      Total Protein 6.8 g/dL      Albumin 3.70 g/dL      ALT (SGPT) 26 U/L      AST (SGOT) 18 U/L      Alkaline Phosphatase 97 U/L      Total Bilirubin 0.6 mg/dL      eGFR Non African Amer 98 mL/min/1.73      Globulin 3.1 gm/dL      A/G Ratio 1.2 g/dL       BUN/Creatinine Ratio 27.2     Anion Gap 12.0 mmol/L     Narrative:      GFR Normal >60  Chronic Kidney Disease <60  Kidney Failure <15      C-reactive Protein [[id number]]  (Abnormal) Collected: 11/15/20 1304    Specimen: Blood from Arm, Left Updated: 11/15/20 1349     C-Reactive Protein 25.04 mg/dL     CBC & Differential [[id number]]  (Abnormal) Collected: 11/15/20 1304    Specimen: Blood from Arm, Left Updated: 11/15/20 1319    Narrative:      The following orders were created for panel order CBC & Differential.  Procedure                               Abnormality         Status                     ---------                               -----------         ------                     CBC Auto Differential[[id number]]        Abnormal            Final result                 Please view results for these tests on the individual orders.    CBC Auto Differential [[id number]]  (Abnormal) Collected: 11/15/20 1304    Specimen: Blood from Arm, Left Updated: 11/15/20 1319     WBC 13.50 10*3/mm3      RBC 4.48 10*6/mm3      Hemoglobin 14.7 g/dL      Hematocrit 42.7 %      MCV 95.3 fL      MCH 32.9 pg      MCHC 34.5 g/dL      RDW 13.7 %      RDW-SD 45.1 fl      MPV 7.7 fL      Platelets 343 10*3/mm3      Neutrophil % 95.8 %      Lymphocyte % 2.0 %      Monocyte % 2.0 %      Eosinophil % 0.0 %      Basophil % 0.2 %      Neutrophils, Absolute 13.00 10*3/mm3      Lymphocytes, Absolute 0.30 10*3/mm3      Monocytes, Absolute 0.30 10*3/mm3      Eosinophils, Absolute 0.00 10*3/mm3      Basophils, Absolute 0.00 10*3/mm3      nRBC 0.0 /100 WBC             Imaging Results (All)     Procedure Component Value Units Date/Time    CT Chest Pulmonary Embolism [[id number]] Collected: 11/15/20 1508     Updated: 11/15/20 1527    Narrative:         DATE OF EXAM:  11/15/2020 2:42 PM     PROCEDURE:  CT CHEST PULMONARY EMBOLISM-     INDICATIONS:   PE suspected, high prob     COMPARISON:   No Comparisons Available     TECHNIQUE:  Routine transaxial  slices were obtained through chest after  administration of intravenous 100 ml of Isovue 370. Reconstructed  coronal and sagittal images were also obtained. Automated exposure  control and iterative reconstruction methods were used.      FINDINGS:  There is excellent pulmonary arterial opacification and there are no  filling defects to suggest pulmonary embolic disease.     There are diffuse, bilateral upper and lower lobe areas of groundglass  attenuation consistent with pneumonia. The pattern would be consistent  with Covid 19 pneumonia. There are right hilar, right paratracheal and  subcarinal prominent lymph nodes likely reactive given the presence of  pneumonia.     Limited evaluation of the upper solid abdominal structures demonstrates  right renal cystic change.        Impression:      No evidence of pulmonary embolism. Multifocal pneumonia in a  distribution consistent with Covid 19 pneumonia     Electronically Signed By-Leif Phoenix MD On:11/15/2020 3:10 PM  This report was finalized on [id number] by  Leif Phoenix MD.                Discharge Disposition:  Home or Self Care    Discharge Medications:     Discharge Medications      New Medications      Instructions Start Date   azithromycin 250 MG tablet  Commonly known as: ZITHROMAX   250 mg, Oral, Daily      dexamethasone 2 MG tablet  Commonly known as: DECADRON   Take 1 tablet by mouth 2 (Two) Times a Day With Meals for 7 days, THEN 1 tablet Daily With Breakfast for 7 days.   Start Date: November 18, 2020     guaiFENesin 600 MG 12 hr tablet  Commonly known as: MUCINEX   600 mg, Oral, Every 12 Hours Scheduled      pantoprazole 40 MG EC tablet  Commonly known as: PROTONIX   40 mg, Oral, Daily         Continue These Medications      Instructions Start Date   albuterol sulfate  (90 Base) MCG/ACT inhaler  Commonly known as: PROVENTIL HFA;VENTOLIN HFA;PROAIR HFA   2 puffs, Inhalation, Every 4 Hours PRN      benzonatate 200 MG capsule  Commonly known  as: TESSALON   200 mg, Oral, 3 Times Daily PRN         Stop These Medications    doxycycline 100 MG capsule  Commonly known as: VIBRAMYCIN     methylPREDNISolone 4 MG dose pack  Commonly known as: MEDROL            Discharge Diet:   Diet Instructions     Diet: Regular      Discharge Diet: Regular          Activity at Discharge:   Activity Instructions     Activity as Tolerated            Follow-up Appointments:  No future appointments.      Test Results Pending at Discharge:  none    Condition on Discharge:    stable      Risk for Readmission (LACE): Score: 8 (11/18/2020  6:00 AM)          Jaswant Elizondo DO  11/18/20  14:18 EST    Time: Discharge 32 min with face-to-face history/exam, writing all prescriptions, and documenting discharge data including care coordination      Electronically signed by Jaswant Elizondo DO at 11/18/20 9577

## 2020-11-20 NOTE — OUTREACH NOTE
COVID-19 Week 1 Survey      Responses   [facility] patient discharged from?  [facility]   Does the patient have one of the following disease processes/diagnoses(primary or secondary)?  COVID-19   COVID-19 underlying condition?  None   Call Number  Call 2   Week 1 Call successful?  Yes   Call start time  0944   Call end time  0948   Discharge diagnosis  PNA due to Covid 19   Person spoke with today (if not patient) and relationship  Virge-father    Meds reviewed with patient/caregiver?  Yes   Is the patient having any side effects they believe may be caused by any medication additions or changes?  No   Does the patient have all medications ordered at discharge?  Yes   Is the patient taking all medications as directed (includes completed medication regime)?  Yes   Does the patient have a primary care provider?   Yes   Comments regarding PCP  Encouraged PCP visit    Has the patient kept scheduled appointments due by today?  N/A   Psychosocial issues?  No   Did the patient receive a copy of their discharge instructions?  -- [unsure]   Did the patient receive a copy of COVID-19 specific instructions?  -- [unsure]   Nursing interventions  Reviewed instructions with patient   What is the patient's perception of their health status since discharge?  Same   Does the patient have any of the following symptoms?  Fever/chills, Cough, Shortness of breath   Nursing Interventions  Nurse provided patient education   Pulse Ox monitoring  None   Is the patient/caregiver able to teach back steps to recovery at home?  Rest and rebuild strength, gradually increase activity, Eat a well-balance diet, Set small, achievable goals for return to baseline health   If the patient is a current smoker, are they able to teach back resources for cessation?  Not a smoker   Is the patient/caregiver able to teach back the hierarchy of who to call/visit for symptoms/problems? PCP, Specialist, Home health nurse, Urgent Care, ED, 911  Yes   COVID-19  call completed?  Yes          Aliya Gill RN

## 2020-11-21 ENCOUNTER — READMISSION MANAGEMENT (OUTPATIENT)
Dept: CALL CENTER | Facility: HOSPITAL | Age: 60
End: 2020-11-21

## 2020-11-21 NOTE — OUTREACH NOTE
COVID-19 Week 1 Survey      Responses   [facility] patient discharged from?  [facility]   Does the patient have one of the following disease processes/diagnoses(primary or secondary)?  COVID-19   COVID-19 underlying condition?  None   Call Number  Call 3   Week 1 Call successful?  Yes   Call start time  1052   Revoke  Decline to participate   Call end time  1057   Discharge diagnosis  PNA due to Covid 19   Meds reviewed with patient/caregiver?  Yes   Is the patient having any side effects they believe may be caused by any medication additions or changes?  No   Does the patient have all medications ordered at discharge?  Yes   Is the patient taking all medications as directed (includes completed medication regime)?  Yes   Does the patient have a primary care provider?   Yes   Comments regarding PCP  Encouraged PCP visit    Does the patient have an appointment with their PCP or specialist within 7 days of discharge?  Greater than 7 days   Has the patient kept scheduled appointments due by today?  N/A   Psychosocial issues?  No   Did the patient receive a copy of their discharge instructions?  Yes   Did the patient receive a copy of COVID-19 specific instructions?  Yes   Nursing interventions  Reviewed instructions with patient   What is the patient's perception of their health status since discharge?  Improving   Does the patient have any of the following symptoms?  None   Nursing Interventions  Nurse provided patient education   Pulse Ox monitoring  Intermittent   Pulse Ox device source  Patient   O2 Sat comments  96-98%   O2 Sat: education provided  Sat levels   Is the patient/caregiver able to teach back steps to recovery at home?  Rest and rebuild strength, gradually increase activity, Eat a well-balance diet, Set small, achievable goals for return to baseline health   If the patient is a current smoker, are they able to teach back resources for cessation?  Not a smoker   Is the patient/caregiver able to teach  back the hierarchy of who to call/visit for symptoms/problems? PCP, Specialist, Home health nurse, Urgent Care, ED, 911  Yes   COVID-19 call completed?  Yes   Revoked  No further contact(revokes)-requires comment [pt doesn't feel he needs more calls, feels great. out of quarantine]          Diamond Triplett, RN

## 2022-03-02 ENCOUNTER — ON CAMPUS - OUTPATIENT (AMBULATORY)
Dept: URBAN - METROPOLITAN AREA HOSPITAL 2 | Facility: HOSPITAL | Age: 62
End: 2022-03-02
Payer: MEDICARE

## 2022-03-02 VITALS
RESPIRATION RATE: 16 BRPM | SYSTOLIC BLOOD PRESSURE: 146 MMHG | OXYGEN SATURATION: 99 % | SYSTOLIC BLOOD PRESSURE: 151 MMHG | SYSTOLIC BLOOD PRESSURE: 140 MMHG | HEART RATE: 66 BPM | HEART RATE: 54 BPM | DIASTOLIC BLOOD PRESSURE: 84 MMHG | HEART RATE: 58 BPM | DIASTOLIC BLOOD PRESSURE: 100 MMHG | OXYGEN SATURATION: 97 % | DIASTOLIC BLOOD PRESSURE: 99 MMHG | WEIGHT: 186 LBS | DIASTOLIC BLOOD PRESSURE: 91 MMHG | SYSTOLIC BLOOD PRESSURE: 181 MMHG | OXYGEN SATURATION: 96 % | RESPIRATION RATE: 17 BRPM | DIASTOLIC BLOOD PRESSURE: 90 MMHG | OXYGEN SATURATION: 100 % | HEART RATE: 52 BPM | DIASTOLIC BLOOD PRESSURE: 101 MMHG | TEMPERATURE: 98.2 F | HEART RATE: 56 BPM | SYSTOLIC BLOOD PRESSURE: 143 MMHG | HEART RATE: 70 BPM | RESPIRATION RATE: 18 BRPM | HEART RATE: 38 BPM | DIASTOLIC BLOOD PRESSURE: 86 MMHG | SYSTOLIC BLOOD PRESSURE: 126 MMHG | DIASTOLIC BLOOD PRESSURE: 112 MMHG | OXYGEN SATURATION: 98 % | SYSTOLIC BLOOD PRESSURE: 142 MMHG | HEIGHT: 70 IN

## 2022-03-02 DIAGNOSIS — K57.30 DIVERTICULOSIS OF LARGE INTESTINE WITHOUT PERFORATION OR ABS: ICD-10-CM

## 2022-03-02 DIAGNOSIS — K64.1 SECOND DEGREE HEMORRHOIDS: ICD-10-CM

## 2022-03-02 DIAGNOSIS — Z12.11 ENCOUNTER FOR SCREENING FOR MALIGNANT NEOPLASM OF COLON: ICD-10-CM

## 2022-03-02 PROCEDURE — 45378 DIAGNOSTIC COLONOSCOPY: CPT | Mod: 33 | Performed by: INTERNAL MEDICINE

## 2025-01-13 ENCOUNTER — LAB REQUISITION (OUTPATIENT)
Dept: LAB | Facility: HOSPITAL | Age: 65
End: 2025-01-13
Payer: COMMERCIAL

## 2025-01-13 DIAGNOSIS — Z47.89 ENCOUNTER FOR OTHER ORTHOPEDIC AFTERCARE: ICD-10-CM

## 2025-01-13 DIAGNOSIS — I10 ESSENTIAL (PRIMARY) HYPERTENSION: ICD-10-CM

## 2025-01-13 DIAGNOSIS — B95.62 METHICILLIN RESISTANT STAPHYLOCOCCUS AUREUS INFECTION AS THE CAUSE OF DISEASES CLASSIFIED ELSEWHERE: ICD-10-CM

## 2025-01-13 LAB
ANION GAP SERPL CALCULATED.3IONS-SCNC: 10 MMOL/L (ref 5–15)
BASOPHILS # BLD AUTO: 0.03 10*3/MM3 (ref 0–0.2)
BASOPHILS NFR BLD AUTO: 0.6 % (ref 0–1.5)
BUN SERPL-MCNC: 13 MG/DL (ref 8–23)
BUN/CREAT SERPL: 17.3 (ref 7–25)
CALCIUM SPEC-SCNC: 8 MG/DL (ref 8.6–10.5)
CHLORIDE SERPL-SCNC: 101 MMOL/L (ref 98–107)
CO2 SERPL-SCNC: 19 MMOL/L (ref 22–29)
CREAT SERPL-MCNC: 0.75 MG/DL (ref 0.76–1.27)
CRP SERPL-MCNC: 11.86 MG/DL (ref 0–0.5)
DEPRECATED RDW RBC AUTO: 41.2 FL (ref 37–54)
EGFRCR SERPLBLD CKD-EPI 2021: 101.4 ML/MIN/1.73
EOSINOPHIL # BLD AUTO: 0.09 10*3/MM3 (ref 0–0.4)
EOSINOPHIL NFR BLD AUTO: 1.8 % (ref 0.3–6.2)
ERYTHROCYTE [DISTWIDTH] IN BLOOD BY AUTOMATED COUNT: 12.8 % (ref 12.3–15.4)
GLUCOSE SERPL-MCNC: 119 MG/DL (ref 65–99)
HCT VFR BLD AUTO: 26.1 % (ref 37.5–51)
HGB BLD-MCNC: 9.4 G/DL (ref 13–17.7)
IMM GRANULOCYTES # BLD AUTO: 0.05 10*3/MM3 (ref 0–0.05)
IMM GRANULOCYTES NFR BLD AUTO: 1 % (ref 0–0.5)
LYMPHOCYTES # BLD AUTO: 0.91 10*3/MM3 (ref 0.7–3.1)
LYMPHOCYTES NFR BLD AUTO: 17.9 % (ref 19.6–45.3)
MCH RBC QN AUTO: 32.8 PG (ref 26.6–33)
MCHC RBC AUTO-ENTMCNC: 36 G/DL (ref 31.5–35.7)
MCV RBC AUTO: 90.9 FL (ref 79–97)
MONOCYTES # BLD AUTO: 0.33 10*3/MM3 (ref 0.1–0.9)
MONOCYTES NFR BLD AUTO: 6.5 % (ref 5–12)
NEUTROPHILS NFR BLD AUTO: 3.67 10*3/MM3 (ref 1.7–7)
NEUTROPHILS NFR BLD AUTO: 72.2 % (ref 42.7–76)
NRBC BLD AUTO-RTO: 0 /100 WBC (ref 0–0.2)
PLAT MORPH BLD: NORMAL
PLATELET # BLD AUTO: 73 10*3/MM3 (ref 140–450)
PMV BLD AUTO: 10.2 FL (ref 6–12)
POTASSIUM SERPL-SCNC: 3.6 MMOL/L (ref 3.5–5.2)
RBC # BLD AUTO: 2.87 10*6/MM3 (ref 4.14–5.8)
RBC MORPH BLD: NORMAL
SODIUM SERPL-SCNC: 130 MMOL/L (ref 136–145)
WBC MORPH BLD: NORMAL
WBC NRBC COR # BLD AUTO: 5.08 10*3/MM3 (ref 3.4–10.8)

## 2025-01-13 PROCEDURE — 85025 COMPLETE CBC W/AUTO DIFF WBC: CPT | Performed by: INTERNAL MEDICINE

## 2025-01-13 PROCEDURE — 85007 BL SMEAR W/DIFF WBC COUNT: CPT | Performed by: INTERNAL MEDICINE

## 2025-01-13 PROCEDURE — 80202 ASSAY OF VANCOMYCIN: CPT | Performed by: INTERNAL MEDICINE

## 2025-01-13 PROCEDURE — 86140 C-REACTIVE PROTEIN: CPT | Performed by: INTERNAL MEDICINE

## 2025-01-13 PROCEDURE — 80048 BASIC METABOLIC PNL TOTAL CA: CPT | Performed by: INTERNAL MEDICINE

## 2025-01-16 LAB — VANCOMYCIN TROUGH SERPL-MCNC: 25.3 MCG/ML (ref 5–20)

## 2025-01-20 ENCOUNTER — LAB REQUISITION (OUTPATIENT)
Dept: LAB | Facility: HOSPITAL | Age: 65
End: 2025-01-20

## 2025-01-20 DIAGNOSIS — B95.62 METHICILLIN RESISTANT STAPHYLOCOCCUS AUREUS INFECTION AS THE CAUSE OF DISEASES CLASSIFIED ELSEWHERE: ICD-10-CM

## 2025-01-20 DIAGNOSIS — Z47.89 ENCOUNTER FOR OTHER ORTHOPEDIC AFTERCARE: ICD-10-CM

## 2025-01-20 LAB
ANION GAP SERPL CALCULATED.3IONS-SCNC: 12 MMOL/L (ref 5–15)
BASOPHILS # BLD AUTO: 0.05 10*3/MM3 (ref 0–0.2)
BASOPHILS NFR BLD AUTO: 1.1 % (ref 0–1.5)
BUN SERPL-MCNC: 11 MG/DL (ref 8–23)
BUN/CREAT SERPL: 10.7 (ref 7–25)
CALCIUM SPEC-SCNC: 8.2 MG/DL (ref 8.6–10.5)
CHLORIDE SERPL-SCNC: 99 MMOL/L (ref 98–107)
CO2 SERPL-SCNC: 24 MMOL/L (ref 22–29)
CREAT SERPL-MCNC: 1.03 MG/DL (ref 0.76–1.27)
CRP SERPL-MCNC: 7.66 MG/DL (ref 0–0.5)
DEPRECATED RDW RBC AUTO: 42.4 FL (ref 37–54)
EGFRCR SERPLBLD CKD-EPI 2021: 81.6 ML/MIN/1.73
EOSINOPHIL # BLD AUTO: 0.25 10*3/MM3 (ref 0–0.4)
EOSINOPHIL NFR BLD AUTO: 5.3 % (ref 0.3–6.2)
ERYTHROCYTE [DISTWIDTH] IN BLOOD BY AUTOMATED COUNT: 12.8 % (ref 12.3–15.4)
GLUCOSE SERPL-MCNC: 108 MG/DL (ref 65–99)
HCT VFR BLD AUTO: 28.1 % (ref 37.5–51)
HGB BLD-MCNC: 9.7 G/DL (ref 13–17.7)
IMM GRANULOCYTES # BLD AUTO: 0.07 10*3/MM3 (ref 0–0.05)
IMM GRANULOCYTES NFR BLD AUTO: 1.5 % (ref 0–0.5)
LYMPHOCYTES # BLD AUTO: 0.72 10*3/MM3 (ref 0.7–3.1)
LYMPHOCYTES NFR BLD AUTO: 15.1 % (ref 19.6–45.3)
MCH RBC QN AUTO: 31.8 PG (ref 26.6–33)
MCHC RBC AUTO-ENTMCNC: 34.5 G/DL (ref 31.5–35.7)
MCV RBC AUTO: 92.1 FL (ref 79–97)
MONOCYTES # BLD AUTO: 0.25 10*3/MM3 (ref 0.1–0.9)
MONOCYTES NFR BLD AUTO: 5.3 % (ref 5–12)
NEUTROPHILS NFR BLD AUTO: 3.42 10*3/MM3 (ref 1.7–7)
NEUTROPHILS NFR BLD AUTO: 71.7 % (ref 42.7–76)
NRBC BLD AUTO-RTO: 0 /100 WBC (ref 0–0.2)
PLATELET # BLD AUTO: 82 10*3/MM3 (ref 140–450)
PMV BLD AUTO: 11.9 FL (ref 6–12)
POTASSIUM SERPL-SCNC: 3.8 MMOL/L (ref 3.5–5.2)
RBC # BLD AUTO: 3.05 10*6/MM3 (ref 4.14–5.8)
SODIUM SERPL-SCNC: 135 MMOL/L (ref 136–145)
VANCOMYCIN TROUGH SERPL-MCNC: 28.4 MCG/ML (ref 5–20)
WBC NRBC COR # BLD AUTO: 4.76 10*3/MM3 (ref 3.4–10.8)

## 2025-01-20 PROCEDURE — 80202 ASSAY OF VANCOMYCIN: CPT | Performed by: INTERNAL MEDICINE

## 2025-01-20 PROCEDURE — 80048 BASIC METABOLIC PNL TOTAL CA: CPT | Performed by: INTERNAL MEDICINE

## 2025-01-20 PROCEDURE — 86140 C-REACTIVE PROTEIN: CPT | Performed by: INTERNAL MEDICINE

## 2025-01-20 PROCEDURE — 85025 COMPLETE CBC W/AUTO DIFF WBC: CPT | Performed by: INTERNAL MEDICINE

## 2025-01-27 ENCOUNTER — LAB REQUISITION (OUTPATIENT)
Dept: LAB | Facility: HOSPITAL | Age: 65
End: 2025-01-27

## 2025-01-27 DIAGNOSIS — B95.62 METHICILLIN RESISTANT STAPHYLOCOCCUS AUREUS INFECTION AS THE CAUSE OF DISEASES CLASSIFIED ELSEWHERE: ICD-10-CM

## 2025-01-27 DIAGNOSIS — Z47.89 ENCOUNTER FOR OTHER ORTHOPEDIC AFTERCARE: ICD-10-CM

## 2025-01-27 DIAGNOSIS — I10 ESSENTIAL (PRIMARY) HYPERTENSION: ICD-10-CM

## 2025-01-27 LAB
ANION GAP SERPL CALCULATED.3IONS-SCNC: 13.2 MMOL/L (ref 5–15)
BASOPHILS # BLD AUTO: 0.13 10*3/MM3 (ref 0–0.2)
BASOPHILS NFR BLD AUTO: 1.5 % (ref 0–1.5)
BUN SERPL-MCNC: 14 MG/DL (ref 8–23)
BUN/CREAT SERPL: 7.2 (ref 7–25)
CALCIUM SPEC-SCNC: 8.6 MG/DL (ref 8.6–10.5)
CHLORIDE SERPL-SCNC: 102 MMOL/L (ref 98–107)
CO2 SERPL-SCNC: 22.8 MMOL/L (ref 22–29)
CREAT SERPL-MCNC: 1.95 MG/DL (ref 0.76–1.27)
CRP SERPL-MCNC: 5.52 MG/DL (ref 0–0.5)
DEPRECATED RDW RBC AUTO: 44.5 FL (ref 37–54)
EGFRCR SERPLBLD CKD-EPI 2021: 37.9 ML/MIN/1.73
EOSINOPHIL # BLD AUTO: 0.18 10*3/MM3 (ref 0–0.4)
EOSINOPHIL NFR BLD AUTO: 2 % (ref 0.3–6.2)
ERYTHROCYTE [DISTWIDTH] IN BLOOD BY AUTOMATED COUNT: 13.4 % (ref 12.3–15.4)
GLUCOSE SERPL-MCNC: 88 MG/DL (ref 65–99)
HCT VFR BLD AUTO: 30.8 % (ref 37.5–51)
HGB BLD-MCNC: 10 G/DL (ref 13–17.7)
IMM GRANULOCYTES # BLD AUTO: 0.08 10*3/MM3 (ref 0–0.05)
IMM GRANULOCYTES NFR BLD AUTO: 0.9 % (ref 0–0.5)
LYMPHOCYTES # BLD AUTO: 1.07 10*3/MM3 (ref 0.7–3.1)
LYMPHOCYTES NFR BLD AUTO: 12 % (ref 19.6–45.3)
MCH RBC QN AUTO: 29.7 PG (ref 26.6–33)
MCHC RBC AUTO-ENTMCNC: 32.5 G/DL (ref 31.5–35.7)
MCV RBC AUTO: 91.4 FL (ref 79–97)
MONOCYTES # BLD AUTO: 0.75 10*3/MM3 (ref 0.1–0.9)
MONOCYTES NFR BLD AUTO: 8.4 % (ref 5–12)
NEUTROPHILS NFR BLD AUTO: 6.67 10*3/MM3 (ref 1.7–7)
NEUTROPHILS NFR BLD AUTO: 75.2 % (ref 42.7–76)
NRBC BLD AUTO-RTO: 0 /100 WBC (ref 0–0.2)
PLATELET # BLD AUTO: 133 10*3/MM3 (ref 140–450)
PMV BLD AUTO: 12.5 FL (ref 6–12)
POTASSIUM SERPL-SCNC: 3.5 MMOL/L (ref 3.5–5.2)
RBC # BLD AUTO: 3.37 10*6/MM3 (ref 4.14–5.8)
SODIUM SERPL-SCNC: 138 MMOL/L (ref 136–145)
VANCOMYCIN TROUGH SERPL-MCNC: 60.8 MCG/ML (ref 5–20)
WBC NRBC COR # BLD AUTO: 8.88 10*3/MM3 (ref 3.4–10.8)

## 2025-01-27 PROCEDURE — 85025 COMPLETE CBC W/AUTO DIFF WBC: CPT | Performed by: INTERNAL MEDICINE

## 2025-01-27 PROCEDURE — 86140 C-REACTIVE PROTEIN: CPT | Performed by: INTERNAL MEDICINE

## 2025-01-27 PROCEDURE — 80202 ASSAY OF VANCOMYCIN: CPT | Performed by: INTERNAL MEDICINE

## 2025-01-27 PROCEDURE — 80048 BASIC METABOLIC PNL TOTAL CA: CPT | Performed by: INTERNAL MEDICINE

## 2025-01-29 ENCOUNTER — LAB REQUISITION (OUTPATIENT)
Dept: LAB | Facility: HOSPITAL | Age: 65
End: 2025-01-29

## 2025-01-29 DIAGNOSIS — Z47.89 ENCOUNTER FOR OTHER ORTHOPEDIC AFTERCARE: ICD-10-CM

## 2025-01-29 DIAGNOSIS — B95.62 METHICILLIN RESISTANT STAPHYLOCOCCUS AUREUS INFECTION AS THE CAUSE OF DISEASES CLASSIFIED ELSEWHERE: ICD-10-CM

## 2025-01-29 DIAGNOSIS — I10 ESSENTIAL (PRIMARY) HYPERTENSION: ICD-10-CM

## 2025-01-29 LAB
ANION GAP SERPL CALCULATED.3IONS-SCNC: 12.6 MMOL/L (ref 5–15)
BUN SERPL-MCNC: 21 MG/DL (ref 8–23)
BUN/CREAT SERPL: 10.3 (ref 7–25)
CALCIUM SPEC-SCNC: 8.2 MG/DL (ref 8.6–10.5)
CHLORIDE SERPL-SCNC: 101 MMOL/L (ref 98–107)
CO2 SERPL-SCNC: 25.4 MMOL/L (ref 22–29)
CREAT SERPL-MCNC: 2.03 MG/DL (ref 0.76–1.27)
EGFRCR SERPLBLD CKD-EPI 2021: 36.2 ML/MIN/1.73
GLUCOSE SERPL-MCNC: 86 MG/DL (ref 65–99)
POTASSIUM SERPL-SCNC: 3.2 MMOL/L (ref 3.5–5.2)
SODIUM SERPL-SCNC: 139 MMOL/L (ref 136–145)
VANCOMYCIN TROUGH SERPL-MCNC: 38.5 MCG/ML (ref 5–20)

## 2025-01-29 PROCEDURE — 80048 BASIC METABOLIC PNL TOTAL CA: CPT

## 2025-01-29 PROCEDURE — 80202 ASSAY OF VANCOMYCIN: CPT

## 2025-02-03 ENCOUNTER — LAB REQUISITION (OUTPATIENT)
Dept: LAB | Facility: HOSPITAL | Age: 65
End: 2025-02-03

## 2025-02-03 DIAGNOSIS — B95.62 METHICILLIN RESISTANT STAPHYLOCOCCUS AUREUS INFECTION AS THE CAUSE OF DISEASES CLASSIFIED ELSEWHERE: ICD-10-CM

## 2025-02-03 DIAGNOSIS — Z47.89 ENCOUNTER FOR OTHER ORTHOPEDIC AFTERCARE: ICD-10-CM

## 2025-02-03 DIAGNOSIS — N17.9 ACUTE KIDNEY FAILURE, UNSPECIFIED: ICD-10-CM

## 2025-02-03 LAB
ANION GAP SERPL CALCULATED.3IONS-SCNC: 12.1 MMOL/L (ref 5–15)
BASOPHILS # BLD AUTO: 0.13 10*3/MM3 (ref 0–0.2)
BASOPHILS NFR BLD AUTO: 1.9 % (ref 0–1.5)
BUN SERPL-MCNC: 18 MG/DL (ref 8–23)
BUN/CREAT SERPL: 8.3 (ref 7–25)
CALCIUM SPEC-SCNC: 8.8 MG/DL (ref 8.6–10.5)
CHLORIDE SERPL-SCNC: 101 MMOL/L (ref 98–107)
CK SERPL-CCNC: 35 U/L (ref 20–200)
CO2 SERPL-SCNC: 25.9 MMOL/L (ref 22–29)
CREAT SERPL-MCNC: 2.17 MG/DL (ref 0.76–1.27)
CRP SERPL-MCNC: 5.08 MG/DL (ref 0–0.5)
DEPRECATED RDW RBC AUTO: 46.5 FL (ref 37–54)
EGFRCR SERPLBLD CKD-EPI 2021: 33.4 ML/MIN/1.73
EOSINOPHIL # BLD AUTO: 0.21 10*3/MM3 (ref 0–0.4)
EOSINOPHIL NFR BLD AUTO: 3 % (ref 0.3–6.2)
ERYTHROCYTE [DISTWIDTH] IN BLOOD BY AUTOMATED COUNT: 13.9 % (ref 12.3–15.4)
ERYTHROCYTE [SEDIMENTATION RATE] IN BLOOD: 28 MM/HR (ref 0–20)
GLUCOSE SERPL-MCNC: 87 MG/DL (ref 65–99)
HCT VFR BLD AUTO: 28.4 % (ref 37.5–51)
HGB BLD-MCNC: 9.3 G/DL (ref 13–17.7)
IMM GRANULOCYTES # BLD AUTO: 0.03 10*3/MM3 (ref 0–0.05)
IMM GRANULOCYTES NFR BLD AUTO: 0.4 % (ref 0–0.5)
LYMPHOCYTES # BLD AUTO: 1.46 10*3/MM3 (ref 0.7–3.1)
LYMPHOCYTES NFR BLD AUTO: 21.1 % (ref 19.6–45.3)
MCH RBC QN AUTO: 29.9 PG (ref 26.6–33)
MCHC RBC AUTO-ENTMCNC: 32.7 G/DL (ref 31.5–35.7)
MCV RBC AUTO: 91.3 FL (ref 79–97)
MONOCYTES # BLD AUTO: 0.8 10*3/MM3 (ref 0.1–0.9)
MONOCYTES NFR BLD AUTO: 11.6 % (ref 5–12)
NEUTROPHILS NFR BLD AUTO: 4.29 10*3/MM3 (ref 1.7–7)
NEUTROPHILS NFR BLD AUTO: 62 % (ref 42.7–76)
NRBC BLD AUTO-RTO: 0 /100 WBC (ref 0–0.2)
PLATELET # BLD AUTO: 369 10*3/MM3 (ref 140–450)
PMV BLD AUTO: 10.3 FL (ref 6–12)
POTASSIUM SERPL-SCNC: 3.4 MMOL/L (ref 3.5–5.2)
RBC # BLD AUTO: 3.11 10*6/MM3 (ref 4.14–5.8)
SODIUM SERPL-SCNC: 139 MMOL/L (ref 136–145)
WBC NRBC COR # BLD AUTO: 6.92 10*3/MM3 (ref 3.4–10.8)

## 2025-02-03 PROCEDURE — 82550 ASSAY OF CK (CPK): CPT | Performed by: INTERNAL MEDICINE

## 2025-02-03 PROCEDURE — 85652 RBC SED RATE AUTOMATED: CPT | Performed by: INTERNAL MEDICINE

## 2025-02-03 PROCEDURE — 86140 C-REACTIVE PROTEIN: CPT | Performed by: INTERNAL MEDICINE

## 2025-02-03 PROCEDURE — 80048 BASIC METABOLIC PNL TOTAL CA: CPT | Performed by: INTERNAL MEDICINE

## 2025-02-03 PROCEDURE — 85025 COMPLETE CBC W/AUTO DIFF WBC: CPT | Performed by: INTERNAL MEDICINE

## 2025-02-10 ENCOUNTER — LAB REQUISITION (OUTPATIENT)
Dept: LAB | Facility: HOSPITAL | Age: 65
End: 2025-02-10

## 2025-02-10 DIAGNOSIS — Z47.89 ENCOUNTER FOR OTHER ORTHOPEDIC AFTERCARE: ICD-10-CM

## 2025-02-10 DIAGNOSIS — N17.9 ACUTE KIDNEY FAILURE, UNSPECIFIED: ICD-10-CM

## 2025-02-10 DIAGNOSIS — B95.62 METHICILLIN RESISTANT STAPHYLOCOCCUS AUREUS INFECTION AS THE CAUSE OF DISEASES CLASSIFIED ELSEWHERE: ICD-10-CM

## 2025-02-10 LAB
ANION GAP SERPL CALCULATED.3IONS-SCNC: 9.7 MMOL/L (ref 5–15)
BASOPHILS # BLD AUTO: 0.12 10*3/MM3 (ref 0–0.2)
BASOPHILS NFR BLD AUTO: 1.8 % (ref 0–1.5)
BUN SERPL-MCNC: 18 MG/DL (ref 8–23)
BUN/CREAT SERPL: 9.3 (ref 7–25)
CALCIUM SPEC-SCNC: 9.3 MG/DL (ref 8.6–10.5)
CHLORIDE SERPL-SCNC: 100 MMOL/L (ref 98–107)
CK SERPL-CCNC: 66 U/L (ref 20–200)
CO2 SERPL-SCNC: 24.3 MMOL/L (ref 22–29)
CREAT SERPL-MCNC: 1.94 MG/DL (ref 0.76–1.27)
CRP SERPL-MCNC: 4.16 MG/DL (ref 0–0.5)
DEPRECATED RDW RBC AUTO: 42.4 FL (ref 37–54)
EGFRCR SERPLBLD CKD-EPI 2021: 38.2 ML/MIN/1.73
EOSINOPHIL # BLD AUTO: 0.23 10*3/MM3 (ref 0–0.4)
EOSINOPHIL NFR BLD AUTO: 3.4 % (ref 0.3–6.2)
ERYTHROCYTE [DISTWIDTH] IN BLOOD BY AUTOMATED COUNT: 13.3 % (ref 12.3–15.4)
ERYTHROCYTE [SEDIMENTATION RATE] IN BLOOD: 35 MM/HR (ref 0–20)
GLUCOSE SERPL-MCNC: 84 MG/DL (ref 65–99)
HCT VFR BLD AUTO: 27.6 % (ref 37.5–51)
HGB BLD-MCNC: 9.4 G/DL (ref 13–17.7)
IMM GRANULOCYTES # BLD AUTO: 0.03 10*3/MM3 (ref 0–0.05)
IMM GRANULOCYTES NFR BLD AUTO: 0.4 % (ref 0–0.5)
LYMPHOCYTES # BLD AUTO: 1.52 10*3/MM3 (ref 0.7–3.1)
LYMPHOCYTES NFR BLD AUTO: 22.6 % (ref 19.6–45.3)
MCH RBC QN AUTO: 29.9 PG (ref 26.6–33)
MCHC RBC AUTO-ENTMCNC: 34.1 G/DL (ref 31.5–35.7)
MCV RBC AUTO: 87.9 FL (ref 79–97)
MONOCYTES # BLD AUTO: 0.68 10*3/MM3 (ref 0.1–0.9)
MONOCYTES NFR BLD AUTO: 10.1 % (ref 5–12)
NEUTROPHILS NFR BLD AUTO: 4.15 10*3/MM3 (ref 1.7–7)
NEUTROPHILS NFR BLD AUTO: 61.7 % (ref 42.7–76)
NRBC BLD AUTO-RTO: 0 /100 WBC (ref 0–0.2)
PLATELET # BLD AUTO: 500 10*3/MM3 (ref 140–450)
PMV BLD AUTO: 9.4 FL (ref 6–12)
POTASSIUM SERPL-SCNC: 4.2 MMOL/L (ref 3.5–5.2)
RBC # BLD AUTO: 3.14 10*6/MM3 (ref 4.14–5.8)
SODIUM SERPL-SCNC: 134 MMOL/L (ref 136–145)
WBC NRBC COR # BLD AUTO: 6.73 10*3/MM3 (ref 3.4–10.8)

## 2025-02-10 PROCEDURE — 80048 BASIC METABOLIC PNL TOTAL CA: CPT | Performed by: INTERNAL MEDICINE

## 2025-02-10 PROCEDURE — 82550 ASSAY OF CK (CPK): CPT | Performed by: INTERNAL MEDICINE

## 2025-02-10 PROCEDURE — 85652 RBC SED RATE AUTOMATED: CPT | Performed by: INTERNAL MEDICINE

## 2025-02-10 PROCEDURE — 85025 COMPLETE CBC W/AUTO DIFF WBC: CPT | Performed by: INTERNAL MEDICINE

## 2025-02-10 PROCEDURE — 86140 C-REACTIVE PROTEIN: CPT | Performed by: INTERNAL MEDICINE
